# Patient Record
Sex: FEMALE | Race: WHITE | NOT HISPANIC OR LATINO
[De-identification: names, ages, dates, MRNs, and addresses within clinical notes are randomized per-mention and may not be internally consistent; named-entity substitution may affect disease eponyms.]

---

## 2018-09-11 ENCOUNTER — FORM ENCOUNTER (OUTPATIENT)
Age: 48
End: 2018-09-11

## 2018-09-12 ENCOUNTER — APPOINTMENT (OUTPATIENT)
Dept: RHEUMATOLOGY | Facility: CLINIC | Age: 48
End: 2018-09-12
Payer: MEDICAID

## 2018-09-12 ENCOUNTER — LABORATORY RESULT (OUTPATIENT)
Age: 48
End: 2018-09-12

## 2018-09-12 ENCOUNTER — APPOINTMENT (OUTPATIENT)
Dept: ENDOCRINOLOGY | Facility: CLINIC | Age: 48
End: 2018-09-12
Payer: MEDICAID

## 2018-09-12 VITALS
BODY MASS INDEX: 19.81 KG/M2 | HEART RATE: 98 BPM | HEIGHT: 64 IN | OXYGEN SATURATION: 99 % | DIASTOLIC BLOOD PRESSURE: 77 MMHG | SYSTOLIC BLOOD PRESSURE: 139 MMHG | TEMPERATURE: 99.1 F | WEIGHT: 116 LBS

## 2018-09-12 DIAGNOSIS — Z98.1 ARTHRODESIS STATUS: ICD-10-CM

## 2018-09-12 DIAGNOSIS — F17.200 NICOTINE DEPENDENCE, UNSPECIFIED, UNCOMPLICATED: ICD-10-CM

## 2018-09-12 DIAGNOSIS — Z87.39 PERSONAL HISTORY OF OTHER DISEASES OF THE MUSCULOSKELETAL SYSTEM AND CONNECTIVE TISSUE: ICD-10-CM

## 2018-09-12 PROBLEM — Z00.00 ENCOUNTER FOR PREVENTIVE HEALTH EXAMINATION: Status: ACTIVE | Noted: 2018-09-12

## 2018-09-12 PROCEDURE — 73610 X-RAY EXAM OF ANKLE: CPT | Mod: RT

## 2018-09-12 PROCEDURE — 77080 DXA BONE DENSITY AXIAL: CPT

## 2018-09-12 PROCEDURE — 99204 OFFICE O/P NEW MOD 45 MIN: CPT

## 2018-09-12 PROCEDURE — 73630 X-RAY EXAM OF FOOT: CPT | Mod: RT

## 2018-09-12 PROCEDURE — 73130 X-RAY EXAM OF HAND: CPT | Mod: LT

## 2018-09-14 ENCOUNTER — TRANSCRIPTION ENCOUNTER (OUTPATIENT)
Age: 48
End: 2018-09-14

## 2018-09-14 LAB
25(OH)D3 SERPL-MCNC: 41 NG/ML
ALBUMIN SERPL ELPH-MCNC: 4.4 G/DL
ALP BLD-CCNC: 150 U/L
ALT SERPL-CCNC: 13 U/L
ANION GAP SERPL CALC-SCNC: 16 MMOL/L
APPEARANCE: CLEAR
AST SERPL-CCNC: 14 U/L
B BURGDOR IGG+IGM SER QL IB: NORMAL
BASOPHILS # BLD AUTO: 0.12 K/UL
BASOPHILS NFR BLD AUTO: 0.7 %
BILIRUB SERPL-MCNC: 0.3 MG/DL
BILIRUBIN URINE: NEGATIVE
BLOOD URINE: ABNORMAL
BUN SERPL-MCNC: 15 MG/DL
CALCIUM SERPL-MCNC: 9.6 MG/DL
CCP AB SER IA-ACNC: >250 UNITS
CHLORIDE SERPL-SCNC: 102 MMOL/L
CO2 SERPL-SCNC: 24 MMOL/L
COLOR: YELLOW
CREAT SERPL-MCNC: 0.66 MG/DL
CRP SERPL-MCNC: 1.13 MG/DL
DEPRECATED KAPPA LC FREE/LAMBDA SER: 0.96 RATIO
EOSINOPHIL # BLD AUTO: 0.32 K/UL
EOSINOPHIL NFR BLD AUTO: 1.9 %
ERYTHROCYTE [SEDIMENTATION RATE] IN BLOOD BY WESTERGREN METHOD: 49 MM/HR
FERRITIN SERPL-MCNC: 92 NG/ML
FOLATE SERPL-MCNC: 19.2 NG/ML
GLUCOSE QUALITATIVE U: NEGATIVE MG/DL
GLUCOSE SERPL-MCNC: 79 MG/DL
HBV CORE IGG+IGM SER QL: NONREACTIVE
HBV SURFACE AB SER QL: NONREACTIVE
HBV SURFACE AG SER QL: NONREACTIVE
HCT VFR BLD CALC: 45.9 %
HCV AB SER QL: NONREACTIVE
HCV S/CO RATIO: 0.29 S/CO
HGB BLD-MCNC: 14.7 G/DL
IGA SER QL IEP: 203 MG/DL
IGG SER QL IEP: 1210 MG/DL
IGM SER QL IEP: 227 MG/DL
IMM GRANULOCYTES NFR BLD AUTO: 0.4 %
IRON SATN MFR SERPL: 9 %
IRON SERPL-MCNC: 27 UG/DL
KAPPA LC CSF-MCNC: 2.51 MG/DL
KAPPA LC SERPL-MCNC: 2.42 MG/DL
KETONES URINE: ABNORMAL
LEUKOCYTE ESTERASE URINE: ABNORMAL
LYMPHOCYTES # BLD AUTO: 4.7 K/UL
LYMPHOCYTES NFR BLD AUTO: 27.6 %
M TB IFN-G BLD-IMP: NEGATIVE
MAN DIFF?: NORMAL
MCHC RBC-ENTMCNC: 29.5 PG
MCHC RBC-ENTMCNC: 32 GM/DL
MCV RBC AUTO: 92.2 FL
MONOCYTES # BLD AUTO: 1.17 K/UL
MONOCYTES NFR BLD AUTO: 6.9 %
NEUTROPHILS # BLD AUTO: 10.63 K/UL
NEUTROPHILS NFR BLD AUTO: 62.5 %
NITRITE URINE: NEGATIVE
PH URINE: 6
PLATELET # BLD AUTO: 762 K/UL
POTASSIUM SERPL-SCNC: 4.6 MMOL/L
PROT SERPL-MCNC: 7.9 G/DL
PROTEIN URINE: NEGATIVE MG/DL
QUANTIFERON TB PLUS MITOGEN MINUS NIL: 7.79 IU/ML
QUANTIFERON TB PLUS NIL: 0.02 IU/ML
QUANTIFERON TB PLUS TB1 MINUS NIL: 0 IU/ML
QUANTIFERON TB PLUS TB2 MINUS NIL: 0.01 IU/ML
RBC # BLD: 4.98 M/UL
RBC # FLD: 14.4 %
RF+CCP IGG SER-IMP: ABNORMAL
RHEUMATOID FACT SER QL: 436 IU/ML
SODIUM SERPL-SCNC: 142 MMOL/L
SPECIFIC GRAVITY URINE: 1.01
TIBC SERPL-MCNC: 311 UG/DL
TSH SERPL-ACNC: 0.81 UIU/ML
UIBC SERPL-MCNC: 284 UG/DL
UROBILINOGEN URINE: NEGATIVE MG/DL
VIT B12 SERPL-MCNC: 413 PG/ML
WBC # FLD AUTO: 17.01 K/UL

## 2018-09-15 LAB
ARSENIC, BLOOD: 5 UG/L
CADMIUM SERPL-MCNC: NORMAL UG/L
LEAD BLD-MCNC: NORMAL UG/DL
MERCURY BLD-MCNC: NORMAL UG/L

## 2018-09-25 ENCOUNTER — RX RENEWAL (OUTPATIENT)
Age: 48
End: 2018-09-25

## 2018-09-25 ENCOUNTER — LABORATORY RESULT (OUTPATIENT)
Age: 48
End: 2018-09-25

## 2018-09-25 LAB
BASOPHILS # BLD AUTO: 0.13 K/UL
BASOPHILS NFR BLD AUTO: 0.4 %
CRP SERPL-MCNC: 0.14 MG/DL
EOSINOPHIL # BLD AUTO: 0.18 K/UL
EOSINOPHIL NFR BLD AUTO: 0.6 %
ERYTHROCYTE [SEDIMENTATION RATE] IN BLOOD BY WESTERGREN METHOD: 28 MM/HR
HCT VFR BLD CALC: 45.8 %
HGB BLD-MCNC: 15.2 G/DL
IMM GRANULOCYTES NFR BLD AUTO: 0.7 %
LYMPHOCYTES # BLD AUTO: 4.4 K/UL
LYMPHOCYTES NFR BLD AUTO: 15 %
MAN DIFF?: NORMAL
MCHC RBC-ENTMCNC: 30.8 PG
MCHC RBC-ENTMCNC: 33.2 GM/DL
MCV RBC AUTO: 92.7 FL
MONOCYTES # BLD AUTO: 1.13 K/UL
MONOCYTES NFR BLD AUTO: 3.9 %
NEUTROPHILS # BLD AUTO: 23.29 K/UL
NEUTROPHILS NFR BLD AUTO: 79.4 %
PLATELET # BLD AUTO: 564 K/UL
RBC # BLD: 4.94 M/UL
RBC # FLD: 15.3 %
WBC # FLD AUTO: 29.34 K/UL

## 2018-10-11 ENCOUNTER — LABORATORY RESULT (OUTPATIENT)
Age: 48
End: 2018-10-11

## 2018-10-11 ENCOUNTER — APPOINTMENT (OUTPATIENT)
Dept: RHEUMATOLOGY | Facility: CLINIC | Age: 48
End: 2018-10-11
Payer: MEDICAID

## 2018-10-11 VITALS
RESPIRATION RATE: 16 BRPM | OXYGEN SATURATION: 98 % | HEIGHT: 64 IN | HEART RATE: 96 BPM | SYSTOLIC BLOOD PRESSURE: 125 MMHG | TEMPERATURE: 98.8 F | BODY MASS INDEX: 20.32 KG/M2 | WEIGHT: 119 LBS | DIASTOLIC BLOOD PRESSURE: 77 MMHG

## 2018-10-11 DIAGNOSIS — H92.09 OTALGIA, UNSPECIFIED EAR: ICD-10-CM

## 2018-10-11 LAB
APPEARANCE: CLEAR
BILIRUBIN URINE: NEGATIVE
BLOOD URINE: NEGATIVE
COLOR: YELLOW
GLUCOSE QUALITATIVE U: NEGATIVE MG/DL
KETONES URINE: NEGATIVE
LEUKOCYTE ESTERASE URINE: NEGATIVE
NITRITE URINE: NEGATIVE
PH URINE: 7.5
PROTEIN URINE: NEGATIVE MG/DL
SPECIFIC GRAVITY URINE: 1.01
UROBILINOGEN URINE: NEGATIVE MG/DL

## 2018-10-11 PROCEDURE — 99214 OFFICE O/P EST MOD 30 MIN: CPT

## 2018-10-12 LAB
25(OH)D3 SERPL-MCNC: 27.1 NG/ML
ALBUMIN SERPL ELPH-MCNC: 4.6 G/DL
ALP BLD-CCNC: 121 U/L
ALT SERPL-CCNC: 18 U/L
ANION GAP SERPL CALC-SCNC: 15 MMOL/L
AST SERPL-CCNC: 17 U/L
BASOPHILS # BLD AUTO: 0.08 K/UL
BASOPHILS NFR BLD AUTO: 0.4 %
BILIRUB SERPL-MCNC: 0.4 MG/DL
BUN SERPL-MCNC: 13 MG/DL
CALCIUM SERPL-MCNC: 9.8 MG/DL
CHLORIDE SERPL-SCNC: 100 MMOL/L
CO2 SERPL-SCNC: 27 MMOL/L
CREAT SERPL-MCNC: 0.71 MG/DL
CRP SERPL-MCNC: 0.37 MG/DL
EOSINOPHIL # BLD AUTO: 0.05 K/UL
EOSINOPHIL NFR BLD AUTO: 0.2 %
ERYTHROCYTE [SEDIMENTATION RATE] IN BLOOD BY WESTERGREN METHOD: 35 MM/HR
GLUCOSE SERPL-MCNC: 98 MG/DL
HCT VFR BLD CALC: 47.5 %
HGB BLD-MCNC: 15.4 G/DL
IMM GRANULOCYTES NFR BLD AUTO: 0.9 %
IRON SATN MFR SERPL: 18 %
IRON SERPL-MCNC: 53 UG/DL
LYMPHOCYTES # BLD AUTO: 1.71 K/UL
LYMPHOCYTES NFR BLD AUTO: 7.9 %
MAN DIFF?: NORMAL
MCHC RBC-ENTMCNC: 30.7 PG
MCHC RBC-ENTMCNC: 32.4 GM/DL
MCV RBC AUTO: 94.6 FL
MONOCYTES # BLD AUTO: 0.55 K/UL
MONOCYTES NFR BLD AUTO: 2.5 %
NEUTROPHILS # BLD AUTO: 19.01 K/UL
NEUTROPHILS NFR BLD AUTO: 88.1 %
PLATELET # BLD AUTO: 677 K/UL
POTASSIUM SERPL-SCNC: 4.8 MMOL/L
PROT SERPL-MCNC: 7.6 G/DL
RBC # BLD: 5.02 M/UL
RBC # FLD: 15.7 %
SODIUM SERPL-SCNC: 142 MMOL/L
T3FREE SERPL-MCNC: 3.21 PG/ML
T4 FREE SERPL-MCNC: 1.4 NG/DL
TIBC SERPL-MCNC: 291 UG/DL
UIBC SERPL-MCNC: 238 UG/DL
WBC # FLD AUTO: 21.59 K/UL

## 2018-10-17 LAB — T(9;22)(ABL1,BCR)/CONTROL BLD/T: NORMAL

## 2018-10-30 ENCOUNTER — RX RENEWAL (OUTPATIENT)
Age: 48
End: 2018-10-30

## 2018-11-01 ENCOUNTER — CHART COPY (OUTPATIENT)
Age: 48
End: 2018-11-01

## 2018-11-02 ENCOUNTER — CHART COPY (OUTPATIENT)
Age: 48
End: 2018-11-02

## 2018-11-13 ENCOUNTER — APPOINTMENT (OUTPATIENT)
Dept: RHEUMATOLOGY | Facility: CLINIC | Age: 48
End: 2018-11-13

## 2018-11-14 ENCOUNTER — APPOINTMENT (OUTPATIENT)
Dept: RHEUMATOLOGY | Facility: CLINIC | Age: 48
End: 2018-11-14
Payer: MEDICAID

## 2018-11-14 ENCOUNTER — LABORATORY RESULT (OUTPATIENT)
Age: 48
End: 2018-11-14

## 2018-11-14 PROCEDURE — 99214 OFFICE O/P EST MOD 30 MIN: CPT

## 2018-11-15 ENCOUNTER — APPOINTMENT (OUTPATIENT)
Dept: RHEUMATOLOGY | Facility: CLINIC | Age: 48
End: 2018-11-15

## 2018-11-15 ENCOUNTER — RX RENEWAL (OUTPATIENT)
Age: 48
End: 2018-11-15

## 2018-11-15 LAB — ERYTHROCYTE [SEDIMENTATION RATE] IN BLOOD BY WESTERGREN METHOD: 20 MM/HR

## 2018-11-16 LAB
BASOPHILS # BLD AUTO: 0.11 K/UL
BASOPHILS NFR BLD AUTO: 0.5 %
EOSINOPHIL # BLD AUTO: 0.14 K/UL
EOSINOPHIL NFR BLD AUTO: 0.6 %
HCT VFR BLD CALC: 47 %
HGB BLD-MCNC: 14.9 G/DL
IMM GRANULOCYTES NFR BLD AUTO: 0.4 %
LYMPHOCYTES # BLD AUTO: 3.98 K/UL
LYMPHOCYTES NFR BLD AUTO: 16.6 %
MAN DIFF?: NORMAL
MCHC RBC-ENTMCNC: 29.7 PG
MCHC RBC-ENTMCNC: 31.7 GM/DL
MCV RBC AUTO: 93.8 FL
MONOCYTES # BLD AUTO: 1.26 K/UL
MONOCYTES NFR BLD AUTO: 5.3 %
NEUTROPHILS # BLD AUTO: 18.39 K/UL
NEUTROPHILS NFR BLD AUTO: 76.6 %
PLATELET # BLD AUTO: 607 K/UL
RBC # BLD: 5.01 M/UL
RBC # FLD: 16.6 %
WBC # FLD AUTO: 23.98 K/UL

## 2018-12-21 ENCOUNTER — APPOINTMENT (OUTPATIENT)
Dept: RHEUMATOLOGY | Facility: CLINIC | Age: 48
End: 2018-12-21

## 2019-01-01 ENCOUNTER — TRANSCRIPTION ENCOUNTER (OUTPATIENT)
Age: 49
End: 2019-01-01

## 2019-01-01 ENCOUNTER — LABORATORY RESULT (OUTPATIENT)
Age: 49
End: 2019-01-01

## 2019-01-01 ENCOUNTER — RX RENEWAL (OUTPATIENT)
Age: 49
End: 2019-01-01

## 2019-01-01 ENCOUNTER — APPOINTMENT (OUTPATIENT)
Dept: RHEUMATOLOGY | Facility: CLINIC | Age: 49
End: 2019-01-01

## 2019-01-01 ENCOUNTER — APPOINTMENT (OUTPATIENT)
Dept: RHEUMATOLOGY | Facility: CLINIC | Age: 49
End: 2019-01-01
Payer: MEDICAID

## 2019-01-01 VITALS
BODY MASS INDEX: 23.19 KG/M2 | TEMPERATURE: 98.8 F | DIASTOLIC BLOOD PRESSURE: 77 MMHG | HEART RATE: 102 BPM | SYSTOLIC BLOOD PRESSURE: 146 MMHG | OXYGEN SATURATION: 96 % | WEIGHT: 126 LBS | HEIGHT: 62 IN

## 2019-01-01 LAB
ALBUMIN MFR SERPL ELPH: 56.8 %
ALBUMIN SERPL ELPH-MCNC: 4.2 G/DL
ALBUMIN SERPL-MCNC: 3.7 G/DL
ALBUMIN/GLOB SERPL: 1.3 RATIO
ALP BLD-CCNC: 116 U/L
ALPHA1 GLOB MFR SERPL ELPH: 4.7 %
ALPHA1 GLOB SERPL ELPH-MCNC: 0.3 G/DL
ALPHA2 GLOB MFR SERPL ELPH: 14.6 %
ALPHA2 GLOB SERPL ELPH-MCNC: 0.9 G/DL
ALT SERPL-CCNC: 11 U/L
ANION GAP SERPL CALC-SCNC: 13 MMOL/L
APPEARANCE: CLEAR
AST SERPL-CCNC: 15 U/L
B-GLOBULIN MFR SERPL ELPH: 11.9 %
B-GLOBULIN SERPL ELPH-MCNC: 0.8 G/DL
BASOPHILS # BLD AUTO: 0 K/UL
BASOPHILS # BLD AUTO: 0.19 K/UL
BASOPHILS NFR BLD AUTO: 0 %
BASOPHILS NFR BLD AUTO: 0.8 %
BILIRUB SERPL-MCNC: 0.3 MG/DL
BILIRUBIN URINE: NEGATIVE
BLOOD URINE: NORMAL
BUN SERPL-MCNC: 20 MG/DL
CALCIUM SERPL-MCNC: 9.4 MG/DL
CHLORIDE SERPL-SCNC: 104 MMOL/L
CO2 SERPL-SCNC: 25 MMOL/L
COLOR: NORMAL
CREAT SERPL-MCNC: 0.7 MG/DL
CRP SERPL-MCNC: 0.62 MG/DL
DEPRECATED KAPPA LC FREE/LAMBDA SER: 0.83 RATIO
EOSINOPHIL # BLD AUTO: 0 K/UL
EOSINOPHIL # BLD AUTO: 0.09 K/UL
EOSINOPHIL NFR BLD AUTO: 0 %
EOSINOPHIL NFR BLD AUTO: 0.4 %
ERYTHROCYTE [SEDIMENTATION RATE] IN BLOOD BY WESTERGREN METHOD: 33 MM/HR
FOLATE SERPL-MCNC: >20 NG/ML
GAMMA GLOB FLD ELPH-MCNC: 0.8 G/DL
GAMMA GLOB MFR SERPL ELPH: 12 %
GLUCOSE QUALITATIVE U: NEGATIVE
GLUCOSE SERPL-MCNC: 82 MG/DL
HCT VFR BLD CALC: 46.4 %
HCT VFR BLD CALC: 49.2 %
HGB BLD-MCNC: 14.7 G/DL
HGB BLD-MCNC: 14.8 G/DL
IGA 24H UR QL IFE: NORMAL
IGA SER QL IEP: 173 MG/DL
IGG SER QL IEP: 750 MG/DL
IGM SER QL IEP: 174 MG/DL
IMM GRANULOCYTES NFR BLD AUTO: 0.8 %
INTERPRETATION SERPL IEP-IMP: NORMAL
IRON SATN MFR SERPL: 23 %
IRON SERPL-MCNC: 67 UG/DL
KAPPA LC CSF-MCNC: 2.25 MG/DL
KAPPA LC SERPL-MCNC: 1.86 MG/DL
KETONES URINE: NEGATIVE
LEUKOCYTE ESTERASE URINE: NEGATIVE
LYMPHOCYTES # BLD AUTO: 3.08 K/UL
LYMPHOCYTES # BLD AUTO: 3.49 K/UL
LYMPHOCYTES NFR BLD AUTO: 12.6 %
LYMPHOCYTES NFR BLD AUTO: 13.9 %
M PROTEIN SPEC IFE-MCNC: NORMAL
M TB IFN-G BLD-IMP: NEGATIVE
MAN DIFF?: NORMAL
MAN DIFF?: NORMAL
MCHC RBC-ENTMCNC: 29.8 PG
MCHC RBC-ENTMCNC: 29.9 GM/DL
MCHC RBC-ENTMCNC: 30.5 PG
MCHC RBC-ENTMCNC: 31.9 GM/DL
MCV RBC AUTO: 95.7 FL
MCV RBC AUTO: 99.6 FL
MONOCYTES # BLD AUTO: 1.24 K/UL
MONOCYTES # BLD AUTO: 1.96 K/UL
MONOCYTES NFR BLD AUTO: 5.1 %
MONOCYTES NFR BLD AUTO: 7.8 %
NEUTROPHILS # BLD AUTO: 19.19 K/UL
NEUTROPHILS # BLD AUTO: 19.58 K/UL
NEUTROPHILS NFR BLD AUTO: 76.5 %
NEUTROPHILS NFR BLD AUTO: 80.3 %
NITRITE URINE: NEGATIVE
PH URINE: 7
PLATELET # BLD AUTO: 688 K/UL
PLATELET # BLD AUTO: 724 K/UL
POTASSIUM SERPL-SCNC: 5.1 MMOL/L
PROT SERPL-MCNC: 6.5 G/DL
PROT SERPL-MCNC: 6.5 G/DL
PROT SERPL-MCNC: 6.6 G/DL
PROTEIN URINE: NEGATIVE
QUANTIFERON TB PLUS MITOGEN MINUS NIL: 3.54 IU/ML
QUANTIFERON TB PLUS NIL: 0.01 IU/ML
QUANTIFERON TB PLUS TB1 MINUS NIL: 0.01 IU/ML
QUANTIFERON TB PLUS TB2 MINUS NIL: 0 IU/ML
RBC # BLD: 4.85 M/UL
RBC # BLD: 4.94 M/UL
RBC # FLD: 14.9 %
RBC # FLD: 15.4 %
SODIUM SERPL-SCNC: 142 MMOL/L
SPECIFIC GRAVITY URINE: 1.01
TIBC SERPL-MCNC: 297 UG/DL
TSH SERPL-ACNC: 0.5 UIU/ML
UIBC SERPL-MCNC: 230 UG/DL
UROBILINOGEN URINE: NORMAL
VIT B12 SERPL-MCNC: 326 PG/ML
WBC # FLD AUTO: 24.37 K/UL
WBC # FLD AUTO: 25.09 K/UL

## 2019-01-01 PROCEDURE — 99214 OFFICE O/P EST MOD 30 MIN: CPT

## 2019-01-01 RX ORDER — PREDNISONE 20 MG/1
20 TABLET ORAL
Qty: 30 | Refills: 2 | Status: DISCONTINUED | COMMUNITY
Start: 2018-09-12 | End: 2019-01-01

## 2019-01-18 ENCOUNTER — LABORATORY RESULT (OUTPATIENT)
Age: 49
End: 2019-01-18

## 2019-01-18 ENCOUNTER — APPOINTMENT (OUTPATIENT)
Dept: RHEUMATOLOGY | Facility: CLINIC | Age: 49
End: 2019-01-18
Payer: MEDICAID

## 2019-01-18 VITALS
HEART RATE: 93 BPM | DIASTOLIC BLOOD PRESSURE: 74 MMHG | WEIGHT: 125 LBS | OXYGEN SATURATION: 97 % | HEIGHT: 62 IN | BODY MASS INDEX: 23 KG/M2 | TEMPERATURE: 98.5 F | SYSTOLIC BLOOD PRESSURE: 139 MMHG

## 2019-01-18 DIAGNOSIS — G89.29 OTHER CHRONIC PAIN: ICD-10-CM

## 2019-01-18 LAB
ALBUMIN SERPL ELPH-MCNC: 4.3 G/DL
ALP BLD-CCNC: 104 U/L
ALT SERPL-CCNC: 11 U/L
ANION GAP SERPL CALC-SCNC: 15 MMOL/L
APPEARANCE: CLEAR
AST SERPL-CCNC: 13 U/L
BASOPHILS # BLD AUTO: 0.08 K/UL
BASOPHILS NFR BLD AUTO: 0.3 %
BILIRUB SERPL-MCNC: 0.6 MG/DL
BILIRUBIN URINE: NEGATIVE
BLOOD URINE: ABNORMAL
BUN SERPL-MCNC: 19 MG/DL
CALCIUM SERPL-MCNC: 9.5 MG/DL
CHLORIDE SERPL-SCNC: 104 MMOL/L
CO2 SERPL-SCNC: 26 MMOL/L
COLOR: YELLOW
CREAT SERPL-MCNC: 0.75 MG/DL
CRP SERPL-MCNC: 1.1 MG/DL
EOSINOPHIL # BLD AUTO: 0.09 K/UL
EOSINOPHIL NFR BLD AUTO: 0.3 %
ERYTHROCYTE [SEDIMENTATION RATE] IN BLOOD BY WESTERGREN METHOD: 39 MM/HR
GLUCOSE QUALITATIVE U: NEGATIVE MG/DL
GLUCOSE SERPL-MCNC: 96 MG/DL
HCT VFR BLD CALC: 48.5 %
HGB BLD-MCNC: 15.6 G/DL
IMM GRANULOCYTES NFR BLD AUTO: 0.6 %
KETONES URINE: NEGATIVE
LEUKOCYTE ESTERASE URINE: NEGATIVE
LYMPHOCYTES # BLD AUTO: 2.39 K/UL
LYMPHOCYTES NFR BLD AUTO: 8.8 %
MAN DIFF?: NORMAL
MCHC RBC-ENTMCNC: 30.4 PG
MCHC RBC-ENTMCNC: 32.2 GM/DL
MCV RBC AUTO: 94.4 FL
MONOCYTES # BLD AUTO: 1.42 K/UL
MONOCYTES NFR BLD AUTO: 5.2 %
NEUTROPHILS # BLD AUTO: 22.96 K/UL
NEUTROPHILS NFR BLD AUTO: 84.8 %
NITRITE URINE: NEGATIVE
PH URINE: 5
PLATELET # BLD AUTO: 590 K/UL
POTASSIUM SERPL-SCNC: 4.7 MMOL/L
PROT SERPL-MCNC: 6.9 G/DL
PROTEIN URINE: NEGATIVE MG/DL
RBC # BLD: 5.14 M/UL
RBC # FLD: 14.9 %
SODIUM SERPL-SCNC: 145 MMOL/L
SPECIFIC GRAVITY URINE: 1.03
UROBILINOGEN URINE: NEGATIVE MG/DL
WBC # FLD AUTO: 27.1 K/UL

## 2019-01-18 PROCEDURE — 99214 OFFICE O/P EST MOD 30 MIN: CPT

## 2019-01-18 RX ORDER — TRAMADOL HYDROCHLORIDE 50 MG/1
50 TABLET, COATED ORAL
Qty: 60 | Refills: 0 | Status: ACTIVE | COMMUNITY
Start: 2019-01-18 | End: 1900-01-01

## 2019-01-21 LAB
25(OH)D3 SERPL-MCNC: 22 NG/ML
ALBUMIN MFR SERPL ELPH: 58.1 %
ALBUMIN SERPL-MCNC: 4 G/DL
ALBUMIN/GLOB SERPL: 1.4 RATIO
ALPHA1 GLOB MFR SERPL ELPH: 4.6 %
ALPHA1 GLOB SERPL ELPH-MCNC: 0.3 G/DL
ALPHA2 GLOB MFR SERPL ELPH: 13.6 %
ALPHA2 GLOB SERPL ELPH-MCNC: 0.9 G/DL
B-GLOBULIN MFR SERPL ELPH: 11.4 %
B-GLOBULIN SERPL ELPH-MCNC: 0.8 G/DL
DEPRECATED KAPPA LC FREE/LAMBDA SER: 1.01 RATIO
GAMMA GLOB FLD ELPH-MCNC: 0.8 G/DL
GAMMA GLOB MFR SERPL ELPH: 12.3 %
IGA 24H UR QL IFE: NORMAL
IGA SER QL IEP: 162 MG/DL
IGG SER QL IEP: 982 MG/DL
IGM SER QL IEP: 202 MG/DL
INTERPRETATION SERPL IEP-IMP: NORMAL
KAPPA LC CSF-MCNC: 1.85 MG/DL
KAPPA LC SERPL-MCNC: 1.86 MG/DL
M PROTEIN SPEC IFE-MCNC: NORMAL
PROT SERPL-MCNC: 6.9 G/DL
PROT SERPL-MCNC: 6.9 G/DL

## 2019-03-01 ENCOUNTER — APPOINTMENT (OUTPATIENT)
Dept: RHEUMATOLOGY | Facility: CLINIC | Age: 49
End: 2019-03-01

## 2019-06-25 ENCOUNTER — APPOINTMENT (OUTPATIENT)
Dept: RHEUMATOLOGY | Facility: CLINIC | Age: 49
End: 2019-06-25
Payer: COMMERCIAL

## 2019-06-25 ENCOUNTER — LABORATORY RESULT (OUTPATIENT)
Age: 49
End: 2019-06-25

## 2019-06-25 VITALS
OXYGEN SATURATION: 97 % | HEIGHT: 62 IN | DIASTOLIC BLOOD PRESSURE: 74 MMHG | WEIGHT: 124 LBS | HEART RATE: 92 BPM | TEMPERATURE: 98.3 F | SYSTOLIC BLOOD PRESSURE: 128 MMHG | BODY MASS INDEX: 22.82 KG/M2

## 2019-06-25 PROCEDURE — 99214 OFFICE O/P EST MOD 30 MIN: CPT

## 2019-06-25 NOTE — ASSESSMENT
[FreeTextEntry1] : 48 year-old female with RA - not currently on medication , previously on Enbrel and methotrexate/SSZ\par \par 1. RA - increased in WBC - on prednisone 20 mg  for extended period of time - will re-start xeljanx - discuss risk of recurrent melanoma - will f/u with dermatology for continue monitoring. will start taper of prednisone by 10 mg every 2 weeks with decrease to 5 and 2.5 mg  afterwards.\par 2. Melanoma in situ - Pet scan denied by insurance company due to staging as in situ - referral to dermatology for further assessment\par 3. Leukocytosis - referral to hematology- not done\par 4. Goiter - multiple nodules - refused FNA - repeat US  with no changes\par \par I reviewed previous labs results with patients.\par Laboratory tests ordered today\par Diagnosis and Prognosis discussed\par Continue with current medications\par education provided on  leukocytosis\par F/u  7 weeks

## 2019-06-25 NOTE — REVIEW OF SYSTEMS
[Feeling Poorly] : feeling poorly [Feeling Tired] : feeling tired [As Noted in HPI] : as noted in HPI [Joint Swelling] : joint swelling [Joint Stiffness] : joint stiffness [Negative] : Heme/Lymph

## 2019-06-25 NOTE — PHYSICAL EXAM
[General Appearance - Alert] : alert [Neck Appearance] : the appearance of the neck was normal [General Appearance - In No Acute Distress] : in no acute distress [Neck Cervical Mass (___cm)] : no neck mass was observed [Jugular Venous Distention Increased] : there was no jugular-venous distention [Thyroid Diffuse Enlargement] : the thyroid was not enlarged [Thyroid Nodule] : there were no palpable thyroid nodules [Auscultation Breath Sounds / Voice Sounds] : lungs were clear to auscultation bilaterally [Heart Rate And Rhythm] : heart rate was normal and rhythm regular [Heart Sounds] : normal S1 and S2 [Heart Sounds Gallop] : no gallops [Murmurs] : no murmurs [Heart Sounds Pericardial Friction Rub] : no pericardial rub [Full Pulse] : the pedal pulses are present [Edema] : there was no peripheral edema [Bowel Sounds] : normal bowel sounds [Abdomen Soft] : soft [Abdomen Tenderness] : non-tender [Abdomen Mass (___ Cm)] : no abdominal mass palpated [Cervical Lymph Nodes Enlarged Posterior Bilaterally] : posterior cervical [Cervical Lymph Nodes Enlarged Anterior Bilaterally] : anterior cervical [Supraclavicular Lymph Nodes Enlarged Bilaterally] : supraclavicular [Skin Color & Pigmentation] : normal skin color and pigmentation [Skin Turgor] : normal skin turgor [] : no rash [Oriented To Time, Place, And Person] : oriented to person, place, and time [Impaired Insight] : insight and judgment were intact [Affect] : the affect was normal [FreeTextEntry1] : tender joints: none

## 2019-06-25 NOTE — HISTORY OF PRESENT ILLNESS
[___ Month(s) Ago] : [unfilled] month(s) ago [Arthralgias] : arthralgias [Joint Swelling] : joint swelling [Joint Warmth] : joint warmth [FreeTextEntry1] : still on prednisone - 20 mg - has not seen hematology due to insurance issues\par Leukocytosis persists - has not made an appt to see hematology\par Thyroid goiter - US with multiple nodules - patient refused FNA -will repeat now\par hx of  melanoma - reports as melanoma in situ - PET scan was denied by insurance - has appt with derm\par bilateral ankle pain - has flat feet - needs to see podiatry\par

## 2019-06-26 LAB
25(OH)D3 SERPL-MCNC: 28.1 NG/ML
ALBUMIN SERPL ELPH-MCNC: 4.2 G/DL
ALP BLD-CCNC: 100 U/L
ALT SERPL-CCNC: 14 U/L
ANION GAP SERPL CALC-SCNC: 20 MMOL/L
APPEARANCE: CLEAR
AST SERPL-CCNC: 15 U/L
BASOPHILS # BLD AUTO: 0.12 K/UL
BASOPHILS NFR BLD AUTO: 0.4 %
BILIRUB SERPL-MCNC: 0.4 MG/DL
BILIRUBIN URINE: NEGATIVE
BLOOD URINE: ABNORMAL
BUN SERPL-MCNC: 18 MG/DL
CALCIUM SERPL-MCNC: 9.3 MG/DL
CHLORIDE SERPL-SCNC: 100 MMOL/L
CO2 SERPL-SCNC: 22 MMOL/L
COLOR: YELLOW
CREAT SERPL-MCNC: 0.78 MG/DL
CRP SERPL-MCNC: 1.4 MG/DL
DEPRECATED KAPPA LC FREE/LAMBDA SER: 0.8 RATIO
EOSINOPHIL # BLD AUTO: 0.02 K/UL
EOSINOPHIL NFR BLD AUTO: 0.1 %
ERYTHROCYTE [SEDIMENTATION RATE] IN BLOOD BY WESTERGREN METHOD: 45 MM/HR
FERRITIN SERPL-MCNC: 106 NG/ML
FOLATE SERPL-MCNC: 19.4 NG/ML
GLUCOSE QUALITATIVE U: NEGATIVE
GLUCOSE SERPL-MCNC: 53 MG/DL
HBV CORE IGG+IGM SER QL: NONREACTIVE
HBV SURFACE AB SER QL: NONREACTIVE
HBV SURFACE AG SER QL: NONREACTIVE
HCT VFR BLD CALC: 49.2 %
HCV AB SER QL: NONREACTIVE
HCV S/CO RATIO: 0.16 S/CO
HGB BLD-MCNC: 15.1 G/DL
IGA SER QL IEP: 149 MG/DL
IGG SER QL IEP: 769 MG/DL
IGM SER QL IEP: 162 MG/DL
IMM GRANULOCYTES NFR BLD AUTO: 1.1 %
IRON SATN MFR SERPL: 12 %
IRON SERPL-MCNC: 35 UG/DL
KAPPA LC CSF-MCNC: 2.44 MG/DL
KAPPA LC SERPL-MCNC: 1.95 MG/DL
KETONES URINE: NEGATIVE
LEUKOCYTE ESTERASE URINE: NEGATIVE
LYMPHOCYTES # BLD AUTO: 1.81 K/UL
LYMPHOCYTES NFR BLD AUTO: 6 %
MAN DIFF?: NORMAL
MCHC RBC-ENTMCNC: 29.4 PG
MCHC RBC-ENTMCNC: 30.7 GM/DL
MCV RBC AUTO: 95.7 FL
MONOCYTES # BLD AUTO: 0.59 K/UL
MONOCYTES NFR BLD AUTO: 1.9 %
NEUTROPHILS # BLD AUTO: 27.47 K/UL
NEUTROPHILS NFR BLD AUTO: 90.5 %
NITRITE URINE: NEGATIVE
PH URINE: 5.5
PLATELET # BLD AUTO: 663 K/UL
POTASSIUM SERPL-SCNC: 4.4 MMOL/L
PROT SERPL-MCNC: 6.8 G/DL
PROTEIN URINE: NORMAL
RBC # BLD: 5.14 M/UL
RBC # FLD: 14.9 %
SODIUM SERPL-SCNC: 142 MMOL/L
SPECIFIC GRAVITY URINE: 1.02
TIBC SERPL-MCNC: 293 UG/DL
TSH SERPL-ACNC: 0.57 UIU/ML
UIBC SERPL-MCNC: 258 UG/DL
UROBILINOGEN URINE: NORMAL
VIT B12 SERPL-MCNC: 293 PG/ML
WBC # FLD AUTO: 30.33 K/UL

## 2019-06-26 RX ORDER — PREDNISONE 2.5 MG/1
2.5 TABLET ORAL 3 TIMES DAILY
Qty: 90 | Refills: 2 | Status: DISCONTINUED | COMMUNITY
Start: 2018-11-14 | End: 2019-06-26

## 2019-06-28 LAB
M TB IFN-G BLD-IMP: ABNORMAL
QUANTIFERON TB PLUS MITOGEN MINUS NIL: 0.01 IU/ML
QUANTIFERON TB PLUS NIL: 0.03 IU/ML
QUANTIFERON TB PLUS TB1 MINUS NIL: -0.01 IU/ML
QUANTIFERON TB PLUS TB2 MINUS NIL: -0.01 IU/ML

## 2020-01-01 ENCOUNTER — RESULT REVIEW (OUTPATIENT)
Age: 50
End: 2020-01-01

## 2020-01-01 ENCOUNTER — INPATIENT (INPATIENT)
Facility: HOSPITAL | Age: 50
LOS: 1 days | End: 2020-07-29
Admitting: HOSPITALIST
Payer: MEDICAID

## 2020-01-01 ENCOUNTER — APPOINTMENT (OUTPATIENT)
Dept: RHEUMATOLOGY | Facility: CLINIC | Age: 50
End: 2020-01-01
Payer: MEDICAID

## 2020-01-01 ENCOUNTER — RX RENEWAL (OUTPATIENT)
Age: 50
End: 2020-01-01

## 2020-01-01 ENCOUNTER — APPOINTMENT (OUTPATIENT)
Dept: HEMATOLOGY ONCOLOGY | Facility: CLINIC | Age: 50
End: 2020-01-01
Payer: MEDICAID

## 2020-01-01 ENCOUNTER — OUTPATIENT (OUTPATIENT)
Dept: OUTPATIENT SERVICES | Facility: HOSPITAL | Age: 50
LOS: 1 days | Discharge: HOME | End: 2020-01-01

## 2020-01-01 ENCOUNTER — APPOINTMENT (OUTPATIENT)
Dept: RHEUMATOLOGY | Facility: CLINIC | Age: 50
End: 2020-01-01

## 2020-01-01 VITALS
WEIGHT: 125.66 LBS | RESPIRATION RATE: 17 BRPM | TEMPERATURE: 97.8 F | HEART RATE: 103 BPM | SYSTOLIC BLOOD PRESSURE: 112 MMHG | DIASTOLIC BLOOD PRESSURE: 72 MMHG | OXYGEN SATURATION: 94 % | BODY MASS INDEX: 23.12 KG/M2 | HEIGHT: 62 IN

## 2020-01-01 VITALS
HEART RATE: 94 BPM | OXYGEN SATURATION: 98 % | RESPIRATION RATE: 16 BRPM | SYSTOLIC BLOOD PRESSURE: 131 MMHG | WEIGHT: 130 LBS | HEIGHT: 62 IN | DIASTOLIC BLOOD PRESSURE: 70 MMHG | BODY MASS INDEX: 23.92 KG/M2 | TEMPERATURE: 98.3 F

## 2020-01-01 VITALS
SYSTOLIC BLOOD PRESSURE: 101 MMHG | RESPIRATION RATE: 18 BRPM | TEMPERATURE: 99 F | OXYGEN SATURATION: 95 % | DIASTOLIC BLOOD PRESSURE: 57 MMHG | HEART RATE: 122 BPM

## 2020-01-01 DIAGNOSIS — D72.829 ELEVATED WHITE BLOOD CELL COUNT, UNSPECIFIED: ICD-10-CM

## 2020-01-01 DIAGNOSIS — E87.5 HYPERKALEMIA: ICD-10-CM

## 2020-01-01 DIAGNOSIS — N17.9 ACUTE KIDNEY FAILURE, UNSPECIFIED: ICD-10-CM

## 2020-01-01 DIAGNOSIS — M06.9 RHEUMATOID ARTHRITIS, UNSPECIFIED: ICD-10-CM

## 2020-01-01 DIAGNOSIS — R59.1 GENERALIZED ENLARGED LYMPH NODES: ICD-10-CM

## 2020-01-01 DIAGNOSIS — R63.4 ABNORMAL WEIGHT LOSS: ICD-10-CM

## 2020-01-01 DIAGNOSIS — R79.89 OTHER SPECIFIED ABNORMAL FINDINGS OF BLOOD CHEMISTRY: ICD-10-CM

## 2020-01-01 DIAGNOSIS — R61 GENERALIZED HYPERHIDROSIS: ICD-10-CM

## 2020-01-01 DIAGNOSIS — R01.1 CARDIAC MURMUR, UNSPECIFIED: ICD-10-CM

## 2020-01-01 DIAGNOSIS — R74.0 NONSPECIFIC ELEVATION OF LEVELS OF TRANSAMINASE AND LACTIC ACID DEHYDROGENASE [LDH]: ICD-10-CM

## 2020-01-01 DIAGNOSIS — R06.02 SHORTNESS OF BREATH: ICD-10-CM

## 2020-01-01 DIAGNOSIS — R50.9 FEVER, UNSPECIFIED: ICD-10-CM

## 2020-01-01 DIAGNOSIS — E05.20 THYROTOXICOSIS WITH TOXIC MULTINODULAR GOITER W/OUT THYROTOXIC CRISIS OR STORM: ICD-10-CM

## 2020-01-01 DIAGNOSIS — Z85.820 PERSONAL HISTORY OF MALIGNANT MELANOMA OF SKIN: ICD-10-CM

## 2020-01-01 DIAGNOSIS — E87.2 ACIDOSIS: ICD-10-CM

## 2020-01-01 DIAGNOSIS — E04.9 NONTOXIC GOITER, UNSPECIFIED: ICD-10-CM

## 2020-01-01 DIAGNOSIS — E88.3 TUMOR LYSIS SYNDROME: ICD-10-CM

## 2020-01-01 DIAGNOSIS — D69.6 THROMBOCYTOPENIA, UNSPECIFIED: ICD-10-CM

## 2020-01-01 DIAGNOSIS — Z98.890 OTHER SPECIFIED POSTPROCEDURAL STATES: Chronic | ICD-10-CM

## 2020-01-01 DIAGNOSIS — R94.31 ABNORMAL ELECTROCARDIOGRAM [ECG] [EKG]: ICD-10-CM

## 2020-01-01 DIAGNOSIS — I74.10 EMBOLISM AND THROMBOSIS OF UNSPECIFIED PARTS OF AORTA: ICD-10-CM

## 2020-01-01 DIAGNOSIS — C85.90 NON-HODGKIN LYMPHOMA, UNSPECIFIED, UNSPECIFIED SITE: ICD-10-CM

## 2020-01-01 DIAGNOSIS — Z29.9 ENCOUNTER FOR PROPHYLACTIC MEASURES, UNSPECIFIED: ICD-10-CM

## 2020-01-01 LAB
ACE BLD-CCNC: 110 U/L
ALBUMIN SERPL ELPH-MCNC: 1.8 G/DL — LOW (ref 3.3–5)
ALBUMIN SERPL ELPH-MCNC: 2.2 G/DL — LOW (ref 3.3–5)
ALBUMIN SERPL ELPH-MCNC: 2.3 G/DL — LOW (ref 3.3–5)
ALBUMIN SERPL ELPH-MCNC: 2.6 G/DL — LOW (ref 3.3–5)
ALBUMIN SERPL ELPH-MCNC: 3.3 G/DL
ALBUMIN SERPL ELPH-MCNC: 4.5 G/DL
ALP BLD-CCNC: 115 U/L
ALP BLD-CCNC: 880 U/L
ALP SERPL-CCNC: 1191 U/L — HIGH (ref 40–120)
ALP SERPL-CCNC: 718 U/L — HIGH (ref 40–120)
ALP SERPL-CCNC: 891 U/L — HIGH (ref 40–120)
ALP SERPL-CCNC: 967 U/L — HIGH (ref 40–120)
ALT FLD-CCNC: 104 U/L — HIGH (ref 4–33)
ALT FLD-CCNC: 130 U/L — HIGH (ref 4–33)
ALT FLD-CCNC: 86 U/L — HIGH (ref 4–33)
ALT FLD-CCNC: 94 U/L — HIGH (ref 4–33)
ALT SERPL-CCNC: 17 U/L
ALT SERPL-CCNC: 195 U/L
ANION GAP SERPL CALC-SCNC: 15 MMOL/L
ANION GAP SERPL CALC-SCNC: 16 MMOL/L
ANION GAP SERPL CALC-SCNC: 19 MMO/L — HIGH (ref 7–14)
ANION GAP SERPL CALC-SCNC: 24 MMO/L — HIGH (ref 7–14)
ANION GAP SERPL CALC-SCNC: 30 MMO/L — HIGH (ref 7–14)
ANION GAP SERPL CALC-SCNC: 34 MMO/L — HIGH (ref 7–14)
ANISOCYTOSIS BLD QL: SLIGHT — SIGNIFICANT CHANGE UP
ANISOCYTOSIS BLD QL: SLIGHT — SIGNIFICANT CHANGE UP
APPEARANCE UR: SIGNIFICANT CHANGE UP
APTT BLD: 29.4 SEC — SIGNIFICANT CHANGE UP (ref 27–36.3)
APTT BLD: 30.9 SEC
APTT BLD: 31.3 SEC — SIGNIFICANT CHANGE UP (ref 27–36.3)
APTT BLD: 47.7 SEC — HIGH (ref 27–36.3)
APTT BLD: 69.4 SEC — HIGH (ref 27–36.3)
AST SERPL-CCNC: 108 U/L
AST SERPL-CCNC: 125 U/L — HIGH (ref 4–32)
AST SERPL-CCNC: 147 U/L — HIGH (ref 4–32)
AST SERPL-CCNC: 16 U/L
AST SERPL-CCNC: 179 U/L — HIGH (ref 4–32)
AST SERPL-CCNC: 232 U/L — HIGH (ref 4–32)
BACTERIA # UR AUTO: NEGATIVE — SIGNIFICANT CHANGE UP
BASE EXCESS BLDA CALC-SCNC: -18.8 MMOL/L — SIGNIFICANT CHANGE UP
BASE EXCESS BLDA CALC-SCNC: -22.2 MMOL/L — SIGNIFICANT CHANGE UP
BASE EXCESS BLDV CALC-SCNC: -8.2 MMOL/L — SIGNIFICANT CHANGE UP
BASOPHILS # BLD AUTO: 0 K/UL — SIGNIFICANT CHANGE UP (ref 0–0.2)
BASOPHILS # BLD AUTO: 0.07 K/UL — SIGNIFICANT CHANGE UP (ref 0–0.2)
BASOPHILS # BLD AUTO: 0.11 K/UL — SIGNIFICANT CHANGE UP (ref 0–0.2)
BASOPHILS # BLD AUTO: 0.12 K/UL — SIGNIFICANT CHANGE UP (ref 0–0.2)
BASOPHILS # BLD AUTO: 0.13 K/UL
BASOPHILS # BLD AUTO: 0.16 K/UL — SIGNIFICANT CHANGE UP (ref 0–0.2)
BASOPHILS NFR BLD AUTO: 0 % — SIGNIFICANT CHANGE UP (ref 0–2)
BASOPHILS NFR BLD AUTO: 0.1 % — SIGNIFICANT CHANGE UP (ref 0–2)
BASOPHILS NFR BLD AUTO: 0.2 % — SIGNIFICANT CHANGE UP (ref 0–2)
BASOPHILS NFR BLD AUTO: 0.6 %
BASOPHILS NFR SPEC: 0 % — SIGNIFICANT CHANGE UP (ref 0–2)
BILIRUB DIRECT SERPL-MCNC: 6.3 MG/DL — HIGH (ref 0.1–0.2)
BILIRUB SERPL-MCNC: 0.4 MG/DL
BILIRUB SERPL-MCNC: 5.2 MG/DL — HIGH (ref 0.2–1.2)
BILIRUB SERPL-MCNC: 5.6 MG/DL
BILIRUB SERPL-MCNC: 6.7 MG/DL — HIGH (ref 0.2–1.2)
BILIRUB SERPL-MCNC: 7 MG/DL — HIGH (ref 0.2–1.2)
BILIRUB SERPL-MCNC: 9.4 MG/DL — HIGH (ref 0.2–1.2)
BILIRUB UR-MCNC: HIGH
BLASTS # FLD: 0 % — SIGNIFICANT CHANGE UP (ref 0–0)
BLOOD UR QL VISUAL: NEGATIVE — SIGNIFICANT CHANGE UP
BUN SERPL-MCNC: 107 MG/DL — HIGH (ref 7–23)
BUN SERPL-MCNC: 25 MG/DL
BUN SERPL-MCNC: 52 MG/DL
BUN SERPL-MCNC: 82 MG/DL — HIGH (ref 7–23)
BUN SERPL-MCNC: 93 MG/DL — HIGH (ref 7–23)
BUN SERPL-MCNC: 97 MG/DL — HIGH (ref 7–23)
CA-I BLD-SCNC: 1.08 MMOL/L — SIGNIFICANT CHANGE UP (ref 1.03–1.23)
CA-I BLD-SCNC: 1.18 MMOL/L — SIGNIFICANT CHANGE UP (ref 1.03–1.23)
CALCIUM SERPL-MCNC: 7.3 MG/DL — LOW (ref 8.4–10.5)
CALCIUM SERPL-MCNC: 8.2 MG/DL — LOW (ref 8.4–10.5)
CALCIUM SERPL-MCNC: 8.8 MG/DL
CALCIUM SERPL-MCNC: 8.8 MG/DL — SIGNIFICANT CHANGE UP (ref 8.4–10.5)
CALCIUM SERPL-MCNC: 8.9 MG/DL — SIGNIFICANT CHANGE UP (ref 8.4–10.5)
CALCIUM SERPL-MCNC: 9.6 MG/DL
CHLORIDE BLDA-SCNC: 113 MMOL/L — HIGH (ref 96–108)
CHLORIDE BLDA-SCNC: 113 MMOL/L — HIGH (ref 96–108)
CHLORIDE SERPL-SCNC: 102 MMOL/L
CHLORIDE SERPL-SCNC: 104 MMOL/L — SIGNIFICANT CHANGE UP (ref 98–107)
CHLORIDE SERPL-SCNC: 93 MMOL/L
CHLORIDE SERPL-SCNC: 94 MMOL/L — LOW (ref 98–107)
CHLORIDE SERPL-SCNC: 98 MMOL/L — SIGNIFICANT CHANGE UP (ref 98–107)
CHLORIDE SERPL-SCNC: 98 MMOL/L — SIGNIFICANT CHANGE UP (ref 98–107)
CK MB BLD-MCNC: 4.61 NG/ML — SIGNIFICANT CHANGE UP (ref 1–4.7)
CK MB BLD-MCNC: 5.89 NG/ML — HIGH (ref 1–4.7)
CK SERPL-CCNC: 28 U/L — SIGNIFICANT CHANGE UP (ref 25–170)
CK SERPL-CCNC: 32 U/L — SIGNIFICANT CHANGE UP (ref 25–170)
CK SERPL-CCNC: 49 U/L — SIGNIFICANT CHANGE UP (ref 25–170)
CO2 SERPL-SCNC: 13 MMOL/L — LOW (ref 22–31)
CO2 SERPL-SCNC: 16 MMOL/L — LOW (ref 22–31)
CO2 SERPL-SCNC: 22 MMOL/L
CO2 SERPL-SCNC: 26 MMOL/L
CO2 SERPL-SCNC: 8 MMOL/L — CRITICAL LOW (ref 22–31)
CO2 SERPL-SCNC: 9 MMOL/L — CRITICAL LOW (ref 22–31)
COLOR SPEC: SIGNIFICANT CHANGE UP
CORTIS SERPL-MCNC: 43.1 UG/DL — HIGH (ref 2.7–18.4)
CREAT SERPL-MCNC: 0.77 MG/DL
CREAT SERPL-MCNC: 0.88 MG/DL — SIGNIFICANT CHANGE UP (ref 0.5–1.3)
CREAT SERPL-MCNC: 0.98 MG/DL
CREAT SERPL-MCNC: 0.99 MG/DL — SIGNIFICANT CHANGE UP (ref 0.5–1.3)
CREAT SERPL-MCNC: 1.16 MG/DL — SIGNIFICANT CHANGE UP (ref 0.5–1.3)
CREAT SERPL-MCNC: 1.45 MG/DL — HIGH (ref 0.5–1.3)
CRP SERPL-MCNC: 0.33 MG/DL
D DIMER BLD IA.RAPID-MCNC: 453 NG/ML — SIGNIFICANT CHANGE UP
D DIMER BLD IA.RAPID-MCNC: 453 NG/ML — SIGNIFICANT CHANGE UP
D DIMER BLD IA.RAPID-MCNC: 530 NG/ML — SIGNIFICANT CHANGE UP
D DIMER BLD IA.RAPID-MCNC: 642 NG/ML — SIGNIFICANT CHANGE UP
EOSINOPHIL # BLD AUTO: 0 K/UL — SIGNIFICANT CHANGE UP (ref 0–0.5)
EOSINOPHIL # BLD AUTO: 0 K/UL — SIGNIFICANT CHANGE UP (ref 0–0.5)
EOSINOPHIL # BLD AUTO: 0.03 K/UL
EOSINOPHIL # BLD AUTO: 0.03 K/UL — SIGNIFICANT CHANGE UP (ref 0–0.5)
EOSINOPHIL # BLD AUTO: 0.16 K/UL — SIGNIFICANT CHANGE UP (ref 0–0.5)
EOSINOPHIL # BLD AUTO: 0.26 K/UL — SIGNIFICANT CHANGE UP (ref 0–0.5)
EOSINOPHIL NFR BLD AUTO: 0 % — SIGNIFICANT CHANGE UP (ref 0–6)
EOSINOPHIL NFR BLD AUTO: 0 % — SIGNIFICANT CHANGE UP (ref 0–6)
EOSINOPHIL NFR BLD AUTO: 0.1 %
EOSINOPHIL NFR BLD AUTO: 0.1 % — SIGNIFICANT CHANGE UP (ref 0–6)
EOSINOPHIL NFR BLD AUTO: 0.3 % — SIGNIFICANT CHANGE UP (ref 0–6)
EOSINOPHIL NFR BLD AUTO: 0.4 % — SIGNIFICANT CHANGE UP (ref 0–6)
EOSINOPHIL NFR FLD: 0 % — SIGNIFICANT CHANGE UP (ref 0–6)
ERYTHROCYTE [SEDIMENTATION RATE] IN BLOOD BY WESTERGREN METHOD: 31 MM/HR
FIBRINOGEN PPP-MCNC: 129 MG/DL — LOW (ref 300–520)
FIBRINOGEN PPP-MCNC: 134 MG/DL — LOW (ref 300–520)
FIBRINOGEN PPP-MCNC: 146 MG/DL — LOW (ref 300–520)
FIBRINOGEN PPP-MCNC: 154 MG/DL — LOW (ref 300–520)
FIBRINOGEN PPP-MCNC: 195 MG/DL — LOW (ref 300–520)
GAS PNL BLDV: 128 MMOL/L — LOW (ref 136–146)
GIANT PLATELETS BLD QL SMEAR: PRESENT — SIGNIFICANT CHANGE UP
GLUCOSE BLDA-MCNC: 3 MG/DL — CRITICAL LOW (ref 70–99)
GLUCOSE BLDA-MCNC: 89 MG/DL — SIGNIFICANT CHANGE UP (ref 70–99)
GLUCOSE BLDC GLUCOMTR-MCNC: 118 MG/DL — HIGH (ref 70–99)
GLUCOSE BLDC GLUCOMTR-MCNC: 288 MG/DL — HIGH (ref 70–99)
GLUCOSE BLDC GLUCOMTR-MCNC: <25 MG/DL — CRITICAL LOW (ref 70–99)
GLUCOSE BLDV-MCNC: 108 MG/DL — HIGH (ref 70–99)
GLUCOSE SERPL-MCNC: 106 MG/DL — HIGH (ref 70–99)
GLUCOSE SERPL-MCNC: 114 MG/DL
GLUCOSE SERPL-MCNC: 133 MG/DL
GLUCOSE SERPL-MCNC: 41 MG/DL — CRITICAL LOW (ref 70–99)
GLUCOSE SERPL-MCNC: 65 MG/DL — LOW (ref 70–99)
GLUCOSE SERPL-MCNC: < 5 MG/DL — CRITICAL LOW (ref 70–99)
GLUCOSE UR-MCNC: NEGATIVE — SIGNIFICANT CHANGE UP
HAPTOGLOB SERPL-MCNC: 170 MG/DL — SIGNIFICANT CHANGE UP (ref 34–200)
HBV CORE IGG+IGM SER QL: NONREACTIVE
HBV SURFACE AB SER QL: NONREACTIVE
HBV SURFACE AG SER QL: NONREACTIVE
HCG UR-SCNC: NEGATIVE — SIGNIFICANT CHANGE UP
HCO3 BLDA-SCNC: 10 MMOL/L — CRITICAL LOW (ref 22–26)
HCO3 BLDA-SCNC: 8 MMOL/L — CRITICAL LOW (ref 22–26)
HCO3 BLDV-SCNC: 18 MMOL/L — LOW (ref 20–27)
HCT VFR BLD CALC: 27 % — LOW (ref 34.5–45)
HCT VFR BLD CALC: 33.7 % — LOW (ref 34.5–45)
HCT VFR BLD CALC: 33.9 % — LOW (ref 34.5–45)
HCT VFR BLD CALC: 41.1 % — SIGNIFICANT CHANGE UP (ref 34.5–45)
HCT VFR BLD CALC: 45 % — SIGNIFICANT CHANGE UP (ref 34.5–45)
HCT VFR BLD CALC: 51.3 %
HCT VFR BLDA CALC: 23.5 % — LOW (ref 34.5–46.5)
HCT VFR BLDA CALC: 26.9 % — LOW (ref 34.5–46.5)
HCT VFR BLDV CALC: 42.2 % — SIGNIFICANT CHANGE UP (ref 34.5–45)
HCV AB SER QL: NONREACTIVE
HCV S/CO RATIO: 0.13 S/CO
HGB BLD-MCNC: 10.6 G/DL — LOW (ref 11.5–15.5)
HGB BLD-MCNC: 11.4 G/DL — LOW (ref 11.5–15.5)
HGB BLD-MCNC: 13.4 G/DL — SIGNIFICANT CHANGE UP (ref 11.5–15.5)
HGB BLD-MCNC: 15.1 G/DL — SIGNIFICANT CHANGE UP (ref 11.5–15.5)
HGB BLD-MCNC: 15.8 G/DL
HGB BLD-MCNC: 8.4 G/DL — LOW (ref 11.5–15.5)
HGB BLDA-MCNC: 7.5 G/DL — LOW (ref 11.5–15.5)
HGB BLDA-MCNC: 8.7 G/DL — LOW (ref 11.5–15.5)
HGB BLDV-MCNC: 13.7 G/DL — SIGNIFICANT CHANGE UP (ref 11.5–15.5)
HYALINE CASTS # UR AUTO: HIGH
IMM GRANULOCYTES NFR BLD AUTO: 0.9 %
IMM GRANULOCYTES NFR BLD AUTO: 11.6 % — HIGH (ref 0–1.5)
IMM GRANULOCYTES NFR BLD AUTO: 20.7 % — HIGH (ref 0–1.5)
IMM GRANULOCYTES NFR BLD AUTO: 20.9 % — HIGH (ref 0–1.5)
IMM GRANULOCYTES NFR BLD AUTO: 32.6 % — HIGH (ref 0–1.5)
INR BLD: 1.35 — HIGH (ref 0.88–1.17)
INR BLD: 1.37 — HIGH (ref 0.88–1.17)
INR BLD: 1.64 — HIGH (ref 0.88–1.17)
INR BLD: 1.67 — HIGH (ref 0.88–1.17)
INR BLD: 2.12 — HIGH (ref 0.88–1.17)
INR PPP: 1.18 RATIO
KETONES UR-MCNC: NEGATIVE — SIGNIFICANT CHANGE UP
LACTATE BLDA-SCNC: 17 MMOL/L — CRITICAL HIGH (ref 0.5–2)
LACTATE BLDA-SCNC: 24 MMOL/L — CRITICAL HIGH (ref 0.5–2)
LDH SERPL L TO P-CCNC: 1475 U/L — HIGH (ref 135–225)
LDH SERPL L TO P-CCNC: 2006 U/L — HIGH (ref 135–225)
LDH SERPL L TO P-CCNC: 806 U/L — HIGH (ref 135–225)
LDH SERPL L TO P-CCNC: 855 U/L — HIGH (ref 135–225)
LDH SERPL L TO P-CCNC: 993 U/L — HIGH (ref 135–225)
LDH SERPL-CCNC: 718 U/L
LEUKOCYTE ESTERASE UR-ACNC: NEGATIVE — SIGNIFICANT CHANGE UP
LYMPHOCYTES # BLD AUTO: 10 % — LOW (ref 13–44)
LYMPHOCYTES # BLD AUTO: 11.37 K/UL — HIGH (ref 1–3.3)
LYMPHOCYTES # BLD AUTO: 15.5 % — SIGNIFICANT CHANGE UP (ref 13–44)
LYMPHOCYTES # BLD AUTO: 17.1 % — SIGNIFICANT CHANGE UP (ref 13–44)
LYMPHOCYTES # BLD AUTO: 2.2 K/UL
LYMPHOCYTES # BLD AUTO: 2.23 K/UL — SIGNIFICANT CHANGE UP (ref 1–3.3)
LYMPHOCYTES # BLD AUTO: 3.8 K/UL — HIGH (ref 1–3.3)
LYMPHOCYTES # BLD AUTO: 4.62 K/UL — HIGH (ref 1–3.3)
LYMPHOCYTES # BLD AUTO: 6.6 % — LOW (ref 13–44)
LYMPHOCYTES # BLD AUTO: 7.4 % — LOW (ref 13–44)
LYMPHOCYTES # BLD AUTO: 9.1 K/UL — HIGH (ref 1–3.3)
LYMPHOCYTES NFR BLD AUTO: 10.4 %
LYMPHOCYTES NFR SPEC AUTO: 2.6 % — LOW (ref 13–44)
MACROCYTES BLD QL: SLIGHT — SIGNIFICANT CHANGE UP
MACROCYTES BLD QL: SLIGHT — SIGNIFICANT CHANGE UP
MAGNESIUM SERPL-MCNC: 2.7 MG/DL — HIGH (ref 1.6–2.6)
MAGNESIUM SERPL-MCNC: 3.2 MG/DL — HIGH (ref 1.6–2.6)
MAGNESIUM SERPL-MCNC: 3.3 MG/DL — HIGH (ref 1.6–2.6)
MAN DIFF?: NORMAL
MANUAL SMEAR VERIFICATION: SIGNIFICANT CHANGE UP
MANUAL SMEAR VERIFICATION: SIGNIFICANT CHANGE UP
MCHC RBC-ENTMCNC: 28.8 PG — SIGNIFICANT CHANGE UP (ref 27–34)
MCHC RBC-ENTMCNC: 28.9 PG — SIGNIFICANT CHANGE UP (ref 27–34)
MCHC RBC-ENTMCNC: 29.3 PG — SIGNIFICANT CHANGE UP (ref 27–34)
MCHC RBC-ENTMCNC: 29.5 PG — SIGNIFICANT CHANGE UP (ref 27–34)
MCHC RBC-ENTMCNC: 29.6 PG — SIGNIFICANT CHANGE UP (ref 27–34)
MCHC RBC-ENTMCNC: 30.1 PG
MCHC RBC-ENTMCNC: 30.8 GM/DL
MCHC RBC-ENTMCNC: 31.1 % — LOW (ref 32–36)
MCHC RBC-ENTMCNC: 31.5 % — LOW (ref 32–36)
MCHC RBC-ENTMCNC: 32.6 % — SIGNIFICANT CHANGE UP (ref 32–36)
MCHC RBC-ENTMCNC: 33.6 % — SIGNIFICANT CHANGE UP (ref 32–36)
MCHC RBC-ENTMCNC: 33.6 GM/DL — SIGNIFICANT CHANGE UP (ref 32–36)
MCV RBC AUTO: 87.6 FL — SIGNIFICANT CHANGE UP (ref 80–100)
MCV RBC AUTO: 88.2 FL — SIGNIFICANT CHANGE UP (ref 80–100)
MCV RBC AUTO: 88.4 FL — SIGNIFICANT CHANGE UP (ref 80–100)
MCV RBC AUTO: 91.8 FL — SIGNIFICANT CHANGE UP (ref 80–100)
MCV RBC AUTO: 94.1 FL — SIGNIFICANT CHANGE UP (ref 80–100)
MCV RBC AUTO: 97.7 FL
METAMYELOCYTES # FLD: 2 % — HIGH (ref 0–0)
METAMYELOCYTES # FLD: 4.2 % — HIGH (ref 0–1)
MICROCYTES BLD QL: SLIGHT — SIGNIFICANT CHANGE UP
MONOCYTES # BLD AUTO: 0.79 K/UL
MONOCYTES # BLD AUTO: 11.12 K/UL — HIGH (ref 0–0.9)
MONOCYTES # BLD AUTO: 16.07 K/UL — HIGH (ref 0–0.9)
MONOCYTES # BLD AUTO: 2 K/UL — HIGH (ref 0–0.9)
MONOCYTES # BLD AUTO: 5.12 K/UL — HIGH (ref 0–0.9)
MONOCYTES # BLD AUTO: 6.96 K/UL — HIGH (ref 0–0.9)
MONOCYTES NFR BLD AUTO: 11.8 % — SIGNIFICANT CHANGE UP (ref 2–14)
MONOCYTES NFR BLD AUTO: 19.3 % — HIGH (ref 2–14)
MONOCYTES NFR BLD AUTO: 25.6 % — HIGH (ref 2–14)
MONOCYTES NFR BLD AUTO: 3.7 %
MONOCYTES NFR BLD AUTO: 7.7 % — SIGNIFICANT CHANGE UP (ref 2–14)
MONOCYTES NFR BLD AUTO: 9 % — SIGNIFICANT CHANGE UP (ref 2–14)
MONOCYTES NFR BLD: 11.9 % — HIGH (ref 2–9)
MYELOCYTES NFR BLD: 0 % — SIGNIFICANT CHANGE UP (ref 0–0)
NEUTROPHIL AB SER-ACNC: 71.2 % — SIGNIFICANT CHANGE UP (ref 43–77)
NEUTROPHILS # BLD AUTO: 17.59 K/UL — HIGH (ref 1.8–7.4)
NEUTROPHILS # BLD AUTO: 17.84 K/UL
NEUTROPHILS # BLD AUTO: 27.91 K/UL — HIGH (ref 1.8–7.4)
NEUTROPHILS # BLD AUTO: 28.58 K/UL — HIGH (ref 1.8–7.4)
NEUTROPHILS # BLD AUTO: 30.46 K/UL — HIGH (ref 1.8–7.4)
NEUTROPHILS # BLD AUTO: 36.03 K/UL — HIGH (ref 1.8–7.4)
NEUTROPHILS NFR BLD AUTO: 42 % — LOW (ref 43–77)
NEUTROPHILS NFR BLD AUTO: 45.6 % — SIGNIFICANT CHANGE UP (ref 43–77)
NEUTROPHILS NFR BLD AUTO: 51.8 % — SIGNIFICANT CHANGE UP (ref 43–77)
NEUTROPHILS NFR BLD AUTO: 62.3 % — SIGNIFICANT CHANGE UP (ref 43–77)
NEUTROPHILS NFR BLD AUTO: 79 % — HIGH (ref 43–77)
NEUTROPHILS NFR BLD AUTO: 84.3 %
NEUTS BAND # BLD: 2.5 % — SIGNIFICANT CHANGE UP (ref 0–6)
NITRITE UR-MCNC: NEGATIVE — SIGNIFICANT CHANGE UP
NRBC # BLD: 0 /100 — SIGNIFICANT CHANGE UP (ref 0–0)
NRBC # BLD: 1 /100WBC — SIGNIFICANT CHANGE UP
NRBC # BLD: SIGNIFICANT CHANGE UP /100 WBCS (ref 0–0)
NRBC # FLD: 0.43 K/UL — SIGNIFICANT CHANGE UP (ref 0–0)
NRBC # FLD: 2.43 K/UL — SIGNIFICANT CHANGE UP (ref 0–0)
NRBC # FLD: 3.76 K/UL — SIGNIFICANT CHANGE UP (ref 0–0)
NRBC # FLD: 5.81 K/UL — SIGNIFICANT CHANGE UP (ref 0–0)
NRBC FLD-RTO: 4.1 — SIGNIFICANT CHANGE UP
NRBC FLD-RTO: 6 — SIGNIFICANT CHANGE UP
NRBC FLD-RTO: 8.7 — SIGNIFICANT CHANGE UP
OTHER - HEMATOLOGY %: 5.1 — SIGNIFICANT CHANGE UP
PCO2 BLDA: 33 MMHG — SIGNIFICANT CHANGE UP (ref 32–48)
PCO2 BLDA: 40 MMHG — SIGNIFICANT CHANGE UP (ref 32–48)
PCO2 BLDV: 29 MMHG — LOW (ref 41–51)
PH BLDA: 6.92 PH — CRITICAL LOW (ref 7.35–7.45)
PH BLDA: 7.08 PH — CRITICAL LOW (ref 7.35–7.45)
PH BLDV: 7.36 PH — SIGNIFICANT CHANGE UP (ref 7.32–7.43)
PH UR: 5 — SIGNIFICANT CHANGE UP (ref 5–8)
PHOSPHATE SERPL-MCNC: 3.1 MG/DL — SIGNIFICANT CHANGE UP (ref 2.5–4.5)
PHOSPHATE SERPL-MCNC: 4.4 MG/DL — SIGNIFICANT CHANGE UP (ref 2.5–4.5)
PHOSPHATE SERPL-MCNC: 5.3 MG/DL — HIGH (ref 2.5–4.5)
PHOSPHATE SERPL-MCNC: 7.7 MG/DL — HIGH (ref 2.5–4.5)
PLAT MORPH BLD: NORMAL — SIGNIFICANT CHANGE UP
PLATELET # BLD AUTO: 108 K/UL — LOW (ref 150–400)
PLATELET # BLD AUTO: 111 K/UL — LOW (ref 150–400)
PLATELET # BLD AUTO: 115 K/UL — LOW (ref 150–400)
PLATELET # BLD AUTO: 123 K/UL — LOW (ref 150–400)
PLATELET # BLD AUTO: 663 K/UL
PLATELET # BLD AUTO: 98 K/UL — LOW (ref 150–400)
PLATELET COUNT - ESTIMATE: SIGNIFICANT CHANGE UP
PMV BLD: 12 FL — SIGNIFICANT CHANGE UP (ref 7–13)
PMV BLD: 12.9 FL — SIGNIFICANT CHANGE UP (ref 7–13)
PMV BLD: 13.1 FL — HIGH (ref 7–13)
PMV BLD: 13.1 FL — HIGH (ref 7–13)
PO2 BLDA: 122 MMHG — HIGH (ref 83–108)
PO2 BLDA: 81 MMHG — LOW (ref 83–108)
PO2 BLDV: 57 MMHG — HIGH (ref 35–40)
POLYCHROMASIA BLD QL SMEAR: SLIGHT — SIGNIFICANT CHANGE UP
POTASSIUM BLDA-SCNC: 6.5 MMOL/L — CRITICAL HIGH (ref 3.4–4.5)
POTASSIUM BLDA-SCNC: 7.5 MMOL/L — CRITICAL HIGH (ref 3.4–4.5)
POTASSIUM BLDV-SCNC: 5.4 MMOL/L — HIGH (ref 3.4–4.5)
POTASSIUM SERPL-MCNC: 5.6 MMOL/L — HIGH (ref 3.5–5.3)
POTASSIUM SERPL-MCNC: 6.6 MMOL/L — CRITICAL HIGH (ref 3.5–5.3)
POTASSIUM SERPL-MCNC: 6.9 MMOL/L — CRITICAL HIGH (ref 3.5–5.3)
POTASSIUM SERPL-MCNC: 7.7 MMOL/L — CRITICAL HIGH (ref 3.5–5.3)
POTASSIUM SERPL-SCNC: 4.9 MMOL/L
POTASSIUM SERPL-SCNC: 5.4 MMOL/L
POTASSIUM SERPL-SCNC: 5.6 MMOL/L — HIGH (ref 3.5–5.3)
POTASSIUM SERPL-SCNC: 6.6 MMOL/L — CRITICAL HIGH (ref 3.5–5.3)
POTASSIUM SERPL-SCNC: 6.9 MMOL/L — CRITICAL HIGH (ref 3.5–5.3)
POTASSIUM SERPL-SCNC: 7.7 MMOL/L — CRITICAL HIGH (ref 3.5–5.3)
PROMYELOCYTES # FLD: 0 % — SIGNIFICANT CHANGE UP (ref 0–0)
PROT SERPL-MCNC: 3.3 G/DL — LOW (ref 6–8.3)
PROT SERPL-MCNC: 4.1 G/DL — LOW (ref 6–8.3)
PROT SERPL-MCNC: 4.3 G/DL — LOW (ref 6–8.3)
PROT SERPL-MCNC: 4.9 G/DL — LOW (ref 6–8.3)
PROT SERPL-MCNC: 5.3 G/DL
PROT SERPL-MCNC: 6.9 G/DL
PROT UR-MCNC: 30 — SIGNIFICANT CHANGE UP
PROTHROM AB SERPL-ACNC: 15.7 SEC — HIGH (ref 9.8–13.1)
PROTHROM AB SERPL-ACNC: 15.8 SEC — HIGH (ref 9.8–13.1)
PROTHROM AB SERPL-ACNC: 19 SEC — HIGH (ref 9.8–13.1)
PROTHROM AB SERPL-ACNC: 19.5 SEC — HIGH (ref 9.8–13.1)
PROTHROM AB SERPL-ACNC: 24.7 SEC — HIGH (ref 9.8–13.1)
PT BLD: 13.8 SEC
RBC # BLD: 2.87 M/UL — LOW (ref 3.8–5.2)
RBC # BLD: 3.67 M/UL — LOW (ref 3.8–5.2)
RBC # BLD: 3.87 M/UL — SIGNIFICANT CHANGE UP (ref 3.8–5.2)
RBC # BLD: 4.65 M/UL — SIGNIFICANT CHANGE UP (ref 3.8–5.2)
RBC # BLD: 5.1 M/UL — SIGNIFICANT CHANGE UP (ref 3.8–5.2)
RBC # BLD: 5.25 M/UL
RBC # FLD: 15.7 %
RBC # FLD: 16.5 % — HIGH (ref 10.3–14.5)
RBC # FLD: 18.4 % — HIGH (ref 10.3–14.5)
RBC # FLD: 18.7 % — HIGH (ref 10.3–14.5)
RBC # FLD: 18.7 % — HIGH (ref 10.3–14.5)
RBC # FLD: 19.1 % — HIGH (ref 10.3–14.5)
RBC BLD AUTO: ABNORMAL
RBC CASTS # UR COMP ASSIST: HIGH (ref 0–?)
REVIEW TO FOLLOW: YES — SIGNIFICANT CHANGE UP
SAO2 % BLDA: 88.4 % — LOW (ref 95–99)
SAO2 % BLDA: 94.7 % — LOW (ref 95–99)
SAO2 % BLDV: 85.8 % — HIGH (ref 60–85)
SARS-COV-2 RNA SPEC QL NAA+PROBE: SIGNIFICANT CHANGE UP
SMUDGE CELLS # BLD: PRESENT — SIGNIFICANT CHANGE UP
SMUDGE CELLS # BLD: PRESENT — SIGNIFICANT CHANGE UP
SODIUM BLDA-SCNC: 139 MMOL/L — SIGNIFICANT CHANGE UP (ref 136–146)
SODIUM BLDA-SCNC: 142 MMOL/L — SIGNIFICANT CHANGE UP (ref 136–146)
SODIUM SERPL-SCNC: 131 MMOL/L — LOW (ref 135–145)
SODIUM SERPL-SCNC: 132 MMOL/L
SODIUM SERPL-SCNC: 133 MMOL/L — LOW (ref 135–145)
SODIUM SERPL-SCNC: 137 MMOL/L — SIGNIFICANT CHANGE UP (ref 135–145)
SODIUM SERPL-SCNC: 143 MMOL/L
SODIUM SERPL-SCNC: 146 MMOL/L — HIGH (ref 135–145)
SP GR SPEC: 1.02 — SIGNIFICANT CHANGE UP (ref 1–1.04)
SQUAMOUS # UR AUTO: SIGNIFICANT CHANGE UP
T3FREE SERPL-MCNC: 2.9 PG/ML
T4 AB SER-ACNC: 4.27 UG/DL — LOW (ref 5.1–13)
T4 FREE SERPL-MCNC: 1.2 NG/DL
TROPONIN T, HIGH SENSITIVITY: 151 NG/L — CRITICAL HIGH (ref ?–14)
TROPONIN T, HIGH SENSITIVITY: 165 NG/L — CRITICAL HIGH (ref ?–14)
TROPONIN T, HIGH SENSITIVITY: 173 NG/L — CRITICAL HIGH (ref ?–14)
TROPONIN T, HIGH SENSITIVITY: 198 NG/L — CRITICAL HIGH (ref ?–14)
TSH SERPL-ACNC: 0.24 UIU/ML
TSH SERPL-MCNC: 0.67 UIU/ML — SIGNIFICANT CHANGE UP (ref 0.27–4.2)
URATE SERPL-MCNC: 15.6 MG/DL — HIGH (ref 2.5–7)
URATE SERPL-MCNC: 7 MG/DL — SIGNIFICANT CHANGE UP (ref 2.5–7)
URATE SERPL-MCNC: 7.2 MG/DL — HIGH (ref 2.5–7)
URATE SERPL-MCNC: 7.7 MG/DL — HIGH (ref 2.5–7)
URATE SERPL-MCNC: 8.8 MG/DL — HIGH (ref 2.5–7)
URATE SERPL-MCNC: 9.3 MG/DL
UROBILINOGEN FLD QL: HIGH
VARIANT LYMPHS # BLD: 2.5 % — SIGNIFICANT CHANGE UP
WBC # BLD: 22.26 K/UL — HIGH (ref 3.8–10.5)
WBC # BLD: 57.74 K/UL — CRITICAL HIGH (ref 3.8–10.5)
WBC # BLD: 58.76 K/UL — CRITICAL HIGH (ref 3.8–10.5)
WBC # BLD: 62.68 K/UL — CRITICAL HIGH (ref 3.8–10.5)
WBC # BLD: 66.46 K/UL — CRITICAL HIGH (ref 3.8–10.5)
WBC # FLD AUTO: 21.19 K/UL
WBC # FLD AUTO: 22.26 K/UL — HIGH (ref 3.8–10.5)
WBC # FLD AUTO: 57.74 K/UL — CRITICAL HIGH (ref 3.8–10.5)
WBC # FLD AUTO: 58.76 K/UL — CRITICAL HIGH (ref 3.8–10.5)
WBC # FLD AUTO: 62.68 K/UL — CRITICAL HIGH (ref 3.8–10.5)
WBC # FLD AUTO: 66.46 K/UL — CRITICAL HIGH (ref 3.8–10.5)
WBC UR QL: SIGNIFICANT CHANGE UP (ref 0–?)

## 2020-01-01 PROCEDURE — 99223 1ST HOSP IP/OBS HIGH 75: CPT | Mod: GC

## 2020-01-01 PROCEDURE — 88341 IMHCHEM/IMCYTCHM EA ADD ANTB: CPT | Mod: 26,59

## 2020-01-01 PROCEDURE — 88305 TISSUE EXAM BY PATHOLOGIST: CPT | Mod: 26

## 2020-01-01 PROCEDURE — 36556 INSERT NON-TUNNEL CV CATH: CPT

## 2020-01-01 PROCEDURE — 76937 US GUIDE VASCULAR ACCESS: CPT | Mod: 26,76

## 2020-01-01 PROCEDURE — 36620 INSERTION CATHETER ARTERY: CPT

## 2020-01-01 PROCEDURE — 93975 VASCULAR STUDY: CPT | Mod: 26

## 2020-01-01 PROCEDURE — 99233 SBSQ HOSP IP/OBS HIGH 50: CPT | Mod: GC

## 2020-01-01 PROCEDURE — 88367 INSITU HYBRIDIZATION AUTO: CPT | Mod: 26

## 2020-01-01 PROCEDURE — 88313 SPECIAL STAINS GROUP 2: CPT | Mod: 26

## 2020-01-01 PROCEDURE — 88189 FLOWCYTOMETRY/READ 16 & >: CPT

## 2020-01-01 PROCEDURE — 74177 CT ABD & PELVIS W/CONTRAST: CPT | Mod: 26

## 2020-01-01 PROCEDURE — 31500 INSERT EMERGENCY AIRWAY: CPT

## 2020-01-01 PROCEDURE — 88360 TUMOR IMMUNOHISTOCHEM/MANUAL: CPT | Mod: 26

## 2020-01-01 PROCEDURE — 99205 OFFICE O/P NEW HI 60 MIN: CPT

## 2020-01-01 PROCEDURE — 71045 X-RAY EXAM CHEST 1 VIEW: CPT | Mod: 26

## 2020-01-01 PROCEDURE — 71275 CT ANGIOGRAPHY CHEST: CPT | Mod: 26

## 2020-01-01 PROCEDURE — 99222 1ST HOSP IP/OBS MODERATE 55: CPT

## 2020-01-01 PROCEDURE — 93010 ELECTROCARDIOGRAM REPORT: CPT

## 2020-01-01 PROCEDURE — 85097 BONE MARROW INTERPRETATION: CPT

## 2020-01-01 PROCEDURE — 99232 SBSQ HOSP IP/OBS MODERATE 35: CPT

## 2020-01-01 PROCEDURE — 93306 TTE W/DOPPLER COMPLETE: CPT | Mod: 26

## 2020-01-01 PROCEDURE — 88342 IMHCHEM/IMCYTCHM 1ST ANTB: CPT | Mod: 26,59

## 2020-01-01 PROCEDURE — 99215 OFFICE O/P EST HI 40 MIN: CPT

## 2020-01-01 PROCEDURE — 99291 CRITICAL CARE FIRST HOUR: CPT

## 2020-01-01 PROCEDURE — 88365 INSITU HYBRIDIZATION (FISH): CPT | Mod: 26,59

## 2020-01-01 RX ORDER — DEXTROSE 50 % IN WATER 50 %
25 SYRINGE (ML) INTRAVENOUS ONCE
Refills: 0 | Status: DISCONTINUED | OUTPATIENT
Start: 2020-01-01 | End: 2020-01-01

## 2020-01-01 RX ORDER — SODIUM ZIRCONIUM CYCLOSILICATE 10 G/10G
10 POWDER, FOR SUSPENSION ORAL ONCE
Refills: 0 | Status: COMPLETED | OUTPATIENT
Start: 2020-01-01 | End: 2020-01-01

## 2020-01-01 RX ORDER — HYDROCODONE BITARTRATE AND ACETAMINOPHEN 5; 300 MG/1; MG/1
5-300 TABLET ORAL
Qty: 28 | Refills: 0 | Status: ACTIVE | COMMUNITY
Start: 2020-01-01 | End: 1900-01-01

## 2020-01-01 RX ORDER — HYDROMORPHONE HYDROCHLORIDE 2 MG/ML
1 INJECTION INTRAMUSCULAR; INTRAVENOUS; SUBCUTANEOUS EVERY 4 HOURS
Refills: 0 | Status: DISCONTINUED | OUTPATIENT
Start: 2020-01-01 | End: 2020-01-01

## 2020-01-01 RX ORDER — SODIUM ZIRCONIUM CYCLOSILICATE 10 G/10G
5 POWDER, FOR SUSPENSION ORAL THREE TIMES A DAY
Refills: 0 | Status: DISCONTINUED | OUTPATIENT
Start: 2020-01-01 | End: 2020-01-01

## 2020-01-01 RX ORDER — RASBURICASE 7.5 MG
3 KIT INTRAVENOUS ONCE
Refills: 0 | Status: COMPLETED | OUTPATIENT
Start: 2020-01-01 | End: 2020-01-01

## 2020-01-01 RX ORDER — PREDNISONE 5 MG/1
5 TABLET ORAL
Qty: 30 | Refills: 3 | Status: DISCONTINUED | COMMUNITY
Start: 2019-01-01 | End: 2020-01-01

## 2020-01-01 RX ORDER — CHLORHEXIDINE GLUCONATE 213 G/1000ML
15 SOLUTION TOPICAL EVERY 12 HOURS
Refills: 0 | Status: DISCONTINUED | OUTPATIENT
Start: 2020-01-01 | End: 2020-01-01

## 2020-01-01 RX ORDER — DEXTROSE 10 % IN WATER 10 %
1000 INTRAVENOUS SOLUTION INTRAVENOUS
Refills: 0 | Status: DISCONTINUED | OUTPATIENT
Start: 2020-01-01 | End: 2020-01-01

## 2020-01-01 RX ORDER — FUROSEMIDE 40 MG
40 TABLET ORAL ONCE
Refills: 0 | Status: DISCONTINUED | OUTPATIENT
Start: 2020-01-01 | End: 2020-01-01

## 2020-01-01 RX ORDER — SODIUM BICARBONATE 1 MEQ/ML
50 SYRINGE (ML) INTRAVENOUS ONCE
Refills: 0 | Status: COMPLETED | OUTPATIENT
Start: 2020-01-01 | End: 2020-01-01

## 2020-01-01 RX ORDER — CALCIUM CHLORIDE
1 POWDER (GRAM) MISCELLANEOUS ONCE
Refills: 0 | Status: DISCONTINUED | OUTPATIENT
Start: 2020-01-01 | End: 2020-01-01

## 2020-01-01 RX ORDER — MIDAZOLAM HYDROCHLORIDE 1 MG/ML
4 INJECTION, SOLUTION INTRAMUSCULAR; INTRAVENOUS ONCE
Refills: 0 | Status: DISCONTINUED | OUTPATIENT
Start: 2020-01-01 | End: 2020-01-01

## 2020-01-01 RX ORDER — INSULIN HUMAN 100 [IU]/ML
10 INJECTION, SOLUTION SUBCUTANEOUS ONCE
Refills: 0 | Status: COMPLETED | OUTPATIENT
Start: 2020-01-01 | End: 2020-01-01

## 2020-01-01 RX ORDER — UPADACITINIB 15 MG/1
15 TABLET, EXTENDED RELEASE ORAL
Qty: 30 | Refills: 1 | Status: ACTIVE | COMMUNITY
Start: 2020-01-01

## 2020-01-01 RX ORDER — SODIUM CHLORIDE 9 MG/ML
1000 INJECTION, SOLUTION INTRAVENOUS ONCE
Refills: 0 | Status: COMPLETED | OUTPATIENT
Start: 2020-01-01 | End: 2020-01-01

## 2020-01-01 RX ORDER — MEROPENEM 1 G/30ML
1000 INJECTION INTRAVENOUS EVERY 24 HOURS
Refills: 0 | Status: DISCONTINUED | OUTPATIENT
Start: 2020-01-01 | End: 2020-01-01

## 2020-01-01 RX ORDER — DEXTROSE 50 % IN WATER 50 %
50 SYRINGE (ML) INTRAVENOUS ONCE
Refills: 0 | Status: COMPLETED | OUTPATIENT
Start: 2020-01-01 | End: 2020-01-01

## 2020-01-01 RX ORDER — HEPARIN SODIUM 5000 [USP'U]/ML
1300 INJECTION INTRAVENOUS; SUBCUTANEOUS
Qty: 25000 | Refills: 0 | Status: DISCONTINUED | OUTPATIENT
Start: 2020-01-01 | End: 2020-01-01

## 2020-01-01 RX ORDER — IBUPROFEN 200 MG
200 TABLET ORAL ONCE
Refills: 0 | Status: DISCONTINUED | OUTPATIENT
Start: 2020-01-01 | End: 2020-01-01

## 2020-01-01 RX ORDER — ACETAMINOPHEN 500 MG
1000 TABLET ORAL ONCE
Refills: 0 | Status: COMPLETED | OUTPATIENT
Start: 2020-01-01 | End: 2020-01-01

## 2020-01-01 RX ORDER — HEPARIN SODIUM 5000 [USP'U]/ML
2000 INJECTION INTRAVENOUS; SUBCUTANEOUS EVERY 6 HOURS
Refills: 0 | Status: DISCONTINUED | OUTPATIENT
Start: 2020-01-01 | End: 2020-01-01

## 2020-01-01 RX ORDER — PROPOFOL 10 MG/ML
60 INJECTION, EMULSION INTRAVENOUS ONCE
Refills: 0 | Status: COMPLETED | OUTPATIENT
Start: 2020-01-01 | End: 2020-01-01

## 2020-01-01 RX ORDER — PHENYLEPHRINE HYDROCHLORIDE 10 MG/ML
5 INJECTION INTRAVENOUS
Qty: 160 | Refills: 0 | Status: DISCONTINUED | OUTPATIENT
Start: 2020-01-01 | End: 2020-01-01

## 2020-01-01 RX ORDER — SODIUM CHLORIDE 9 MG/ML
1000 INJECTION INTRAMUSCULAR; INTRAVENOUS; SUBCUTANEOUS
Refills: 0 | Status: DISCONTINUED | OUTPATIENT
Start: 2020-01-01 | End: 2020-01-01

## 2020-01-01 RX ORDER — HYDROMORPHONE HYDROCHLORIDE 2 MG/ML
1 INJECTION INTRAMUSCULAR; INTRAVENOUS; SUBCUTANEOUS ONCE
Refills: 0 | Status: DISCONTINUED | OUTPATIENT
Start: 2020-01-01 | End: 2020-01-01

## 2020-01-01 RX ORDER — SODIUM CHLORIDE 9 MG/ML
1000 INJECTION INTRAMUSCULAR; INTRAVENOUS; SUBCUTANEOUS ONCE
Refills: 0 | Status: COMPLETED | OUTPATIENT
Start: 2020-01-01 | End: 2020-01-01

## 2020-01-01 RX ORDER — MIDAZOLAM HYDROCHLORIDE 1 MG/ML
4 INJECTION, SOLUTION INTRAMUSCULAR; INTRAVENOUS
Qty: 100 | Refills: 0 | Status: DISCONTINUED | OUTPATIENT
Start: 2020-01-01 | End: 2020-01-01

## 2020-01-01 RX ORDER — OXYCODONE HYDROCHLORIDE 5 MG/1
5 TABLET ORAL EVERY 4 HOURS
Refills: 0 | Status: DISCONTINUED | OUTPATIENT
Start: 2020-01-01 | End: 2020-01-01

## 2020-01-01 RX ORDER — HEPARIN SODIUM 5000 [USP'U]/ML
5000 INJECTION INTRAVENOUS; SUBCUTANEOUS ONCE
Refills: 0 | Status: DISCONTINUED | OUTPATIENT
Start: 2020-01-01 | End: 2020-01-01

## 2020-01-01 RX ORDER — FENTANYL CITRATE 50 UG/ML
100 INJECTION INTRAVENOUS
Qty: 2500 | Refills: 0 | Status: DISCONTINUED | OUTPATIENT
Start: 2020-01-01 | End: 2020-01-01

## 2020-01-01 RX ORDER — PROPOFOL 10 MG/ML
60 INJECTION, EMULSION INTRAVENOUS
Qty: 500 | Refills: 0 | Status: DISCONTINUED | OUTPATIENT
Start: 2020-01-01 | End: 2020-01-01

## 2020-01-01 RX ORDER — MEROPENEM 1 G/30ML
1000 INJECTION INTRAVENOUS EVERY 8 HOURS
Refills: 0 | Status: DISCONTINUED | OUTPATIENT
Start: 2020-01-01 | End: 2020-01-01

## 2020-01-01 RX ORDER — PHENYLEPHRINE HYDROCHLORIDE 10 MG/ML
0.5 INJECTION INTRAVENOUS
Qty: 40 | Refills: 0 | Status: DISCONTINUED | OUTPATIENT
Start: 2020-01-01 | End: 2020-01-01

## 2020-01-01 RX ORDER — NOREPINEPHRINE BITARTRATE/D5W 8 MG/250ML
0.05 PLASTIC BAG, INJECTION (ML) INTRAVENOUS
Qty: 16 | Refills: 0 | Status: DISCONTINUED | OUTPATIENT
Start: 2020-01-01 | End: 2020-01-01

## 2020-01-01 RX ORDER — HYDROCORTISONE 20 MG
100 TABLET ORAL EVERY 8 HOURS
Refills: 0 | Status: DISCONTINUED | OUTPATIENT
Start: 2020-01-01 | End: 2020-01-01

## 2020-01-01 RX ORDER — CHLORHEXIDINE GLUCONATE 213 G/1000ML
1 SOLUTION TOPICAL
Refills: 0 | Status: DISCONTINUED | OUTPATIENT
Start: 2020-01-01 | End: 2020-01-01

## 2020-01-01 RX ORDER — PROPOFOL 10 MG/ML
50 INJECTION, EMULSION INTRAVENOUS
Qty: 1000 | Refills: 0 | Status: DISCONTINUED | OUTPATIENT
Start: 2020-01-01 | End: 2020-01-01

## 2020-01-01 RX ORDER — LACTULOSE 10 G/15ML
20 SOLUTION ORAL ONCE
Refills: 0 | Status: COMPLETED | OUTPATIENT
Start: 2020-01-01 | End: 2020-01-01

## 2020-01-01 RX ORDER — ALBUTEROL 90 UG/1
2.5 AEROSOL, METERED ORAL THREE TIMES A DAY
Refills: 0 | Status: DISCONTINUED | OUTPATIENT
Start: 2020-01-01 | End: 2020-01-01

## 2020-01-01 RX ORDER — ALLOPURINOL 300 MG
300 TABLET ORAL DAILY
Refills: 0 | Status: DISCONTINUED | OUTPATIENT
Start: 2020-01-01 | End: 2020-01-01

## 2020-01-01 RX ORDER — SODIUM BICARBONATE 1 MEQ/ML
0.27 SYRINGE (ML) INTRAVENOUS
Qty: 150 | Refills: 0 | Status: DISCONTINUED | OUTPATIENT
Start: 2020-01-01 | End: 2020-01-01

## 2020-01-01 RX ORDER — VASOPRESSIN 20 [USP'U]/ML
0.04 INJECTION INTRAVENOUS
Qty: 50 | Refills: 0 | Status: DISCONTINUED | OUTPATIENT
Start: 2020-01-01 | End: 2020-01-01

## 2020-01-01 RX ORDER — FUROSEMIDE 40 MG
40 TABLET ORAL ONCE
Refills: 0 | Status: COMPLETED | OUTPATIENT
Start: 2020-01-01 | End: 2020-01-01

## 2020-01-01 RX ORDER — SODIUM ZIRCONIUM CYCLOSILICATE 10 G/10G
10 POWDER, FOR SUSPENSION ORAL
Refills: 0 | Status: DISCONTINUED | OUTPATIENT
Start: 2020-01-01 | End: 2020-01-01

## 2020-01-01 RX ORDER — ALBUTEROL 90 UG/1
10 AEROSOL, METERED ORAL ONCE
Refills: 0 | Status: COMPLETED | OUTPATIENT
Start: 2020-01-01 | End: 2020-01-01

## 2020-01-01 RX ORDER — MORPHINE SULFATE 50 MG/1
4 CAPSULE, EXTENDED RELEASE ORAL ONCE
Refills: 0 | Status: DISCONTINUED | OUTPATIENT
Start: 2020-01-01 | End: 2020-01-01

## 2020-01-01 RX ORDER — POLYETHYLENE GLYCOL 3350 17 G/17G
17 POWDER, FOR SOLUTION ORAL
Refills: 0 | Status: DISCONTINUED | OUTPATIENT
Start: 2020-01-01 | End: 2020-01-01

## 2020-01-01 RX ORDER — FENTANYL CITRATE 50 UG/ML
100 INJECTION INTRAVENOUS ONCE
Refills: 0 | Status: DISCONTINUED | OUTPATIENT
Start: 2020-01-01 | End: 2020-01-01

## 2020-01-01 RX ORDER — HEPARIN SODIUM 5000 [USP'U]/ML
INJECTION INTRAVENOUS; SUBCUTANEOUS
Qty: 25000 | Refills: 0 | Status: DISCONTINUED | OUTPATIENT
Start: 2020-01-01 | End: 2020-01-01

## 2020-01-01 RX ORDER — PREDNISONE 20 MG/1
20 TABLET ORAL
Qty: 90 | Refills: 1 | Status: ACTIVE | COMMUNITY
Start: 2020-01-01 | End: 1900-01-01

## 2020-01-01 RX ORDER — ALBUTEROL 90 UG/1
10 AEROSOL, METERED ORAL ONCE
Refills: 0 | Status: DISCONTINUED | OUTPATIENT
Start: 2020-01-01 | End: 2020-01-01

## 2020-01-01 RX ORDER — MIDAZOLAM HYDROCHLORIDE 1 MG/ML
2 INJECTION, SOLUTION INTRAMUSCULAR; INTRAVENOUS ONCE
Refills: 0 | Status: DISCONTINUED | OUTPATIENT
Start: 2020-01-01 | End: 2020-01-01

## 2020-01-01 RX ORDER — RASBURICASE 7.5 MG
6 KIT INTRAVENOUS ONCE
Refills: 0 | Status: COMPLETED | OUTPATIENT
Start: 2020-01-01 | End: 2020-01-01

## 2020-01-01 RX ORDER — HEPARIN SODIUM 5000 [USP'U]/ML
4500 INJECTION INTRAVENOUS; SUBCUTANEOUS EVERY 6 HOURS
Refills: 0 | Status: DISCONTINUED | OUTPATIENT
Start: 2020-01-01 | End: 2020-01-01

## 2020-01-01 RX ORDER — CALCIUM GLUCONATE 100 MG/ML
1 VIAL (ML) INTRAVENOUS ONCE
Refills: 0 | Status: COMPLETED | OUTPATIENT
Start: 2020-01-01 | End: 2020-01-01

## 2020-01-01 RX ORDER — HEPARIN SODIUM 5000 [USP'U]/ML
4500 INJECTION INTRAVENOUS; SUBCUTANEOUS ONCE
Refills: 0 | Status: COMPLETED | OUTPATIENT
Start: 2020-01-01 | End: 2020-01-01

## 2020-01-01 RX ORDER — KETOROLAC TROMETHAMINE 30 MG/ML
15 SYRINGE (ML) INJECTION ONCE
Refills: 0 | Status: DISCONTINUED | OUTPATIENT
Start: 2020-01-01 | End: 2020-01-01

## 2020-01-01 RX ORDER — HYDROMORPHONE HYDROCHLORIDE 2 MG/ML
0.5 INJECTION INTRAMUSCULAR; INTRAVENOUS; SUBCUTANEOUS ONCE
Refills: 0 | Status: DISCONTINUED | OUTPATIENT
Start: 2020-01-01 | End: 2020-01-01

## 2020-01-01 RX ORDER — MIDAZOLAM HYDROCHLORIDE 1 MG/ML
0.02 INJECTION, SOLUTION INTRAMUSCULAR; INTRAVENOUS
Qty: 100 | Refills: 0 | Status: DISCONTINUED | OUTPATIENT
Start: 2020-01-01 | End: 2020-01-01

## 2020-01-01 RX ORDER — TOFACITINIB 11 MG/1
11 TABLET, FILM COATED, EXTENDED RELEASE ORAL
Qty: 30 | Refills: 11 | Status: DISCONTINUED | COMMUNITY
Start: 2018-10-11 | End: 2020-01-01

## 2020-01-01 RX ORDER — SENNA PLUS 8.6 MG/1
2 TABLET ORAL AT BEDTIME
Refills: 0 | Status: DISCONTINUED | OUTPATIENT
Start: 2020-01-01 | End: 2020-01-01

## 2020-01-01 RX ORDER — FENTANYL CITRATE 50 UG/ML
0.5 INJECTION INTRAVENOUS
Qty: 2500 | Refills: 0 | Status: DISCONTINUED | OUTPATIENT
Start: 2020-01-01 | End: 2020-01-01

## 2020-01-01 RX ORDER — LIDOCAINE HCL 20 MG/ML
20 VIAL (ML) INJECTION ONCE
Refills: 0 | Status: DISCONTINUED | OUTPATIENT
Start: 2020-01-01 | End: 2020-01-01

## 2020-01-01 RX ORDER — SODIUM CHLORIDE 9 MG/ML
10 INJECTION INTRAMUSCULAR; INTRAVENOUS; SUBCUTANEOUS
Refills: 0 | Status: DISCONTINUED | OUTPATIENT
Start: 2020-01-01 | End: 2020-01-01

## 2020-01-01 RX ADMIN — Medication 50 MILLILITER(S): at 14:33

## 2020-01-01 RX ADMIN — SODIUM CHLORIDE 200 MILLILITER(S): 9 INJECTION INTRAMUSCULAR; INTRAVENOUS; SUBCUTANEOUS at 14:12

## 2020-01-01 RX ADMIN — SODIUM CHLORIDE 1000 MILLILITER(S): 9 INJECTION, SOLUTION INTRAVENOUS at 14:49

## 2020-01-01 RX ADMIN — Medication 100 GRAM(S): at 14:58

## 2020-01-01 RX ADMIN — PHENYLEPHRINE HYDROCHLORIDE 53.2 MICROGRAM(S)/KG/MIN: 10 INJECTION INTRAVENOUS at 23:38

## 2020-01-01 RX ADMIN — HEPARIN SODIUM 1300 UNIT(S)/HR: 5000 INJECTION INTRAVENOUS; SUBCUTANEOUS at 13:23

## 2020-01-01 RX ADMIN — Medication 300 MILLIGRAM(S): at 10:40

## 2020-01-01 RX ADMIN — SODIUM CHLORIDE 1000 MILLILITER(S): 9 INJECTION INTRAMUSCULAR; INTRAVENOUS; SUBCUTANEOUS at 12:14

## 2020-01-01 RX ADMIN — MORPHINE SULFATE 4 MILLIGRAM(S): 50 CAPSULE, EXTENDED RELEASE ORAL at 16:51

## 2020-01-01 RX ADMIN — Medication 50 MILLIEQUIVALENT(S): at 02:47

## 2020-01-01 RX ADMIN — HEPARIN SODIUM 1300 UNIT(S)/HR: 5000 INJECTION INTRAVENOUS; SUBCUTANEOUS at 06:31

## 2020-01-01 RX ADMIN — SODIUM CHLORIDE 1000 MILLILITER(S): 9 INJECTION INTRAMUSCULAR; INTRAVENOUS; SUBCUTANEOUS at 02:47

## 2020-01-01 RX ADMIN — Medication 50 MILLILITER(S): at 23:37

## 2020-01-01 RX ADMIN — HYDROMORPHONE HYDROCHLORIDE 1 MILLIGRAM(S): 2 INJECTION INTRAMUSCULAR; INTRAVENOUS; SUBCUTANEOUS at 00:02

## 2020-01-01 RX ADMIN — SODIUM ZIRCONIUM CYCLOSILICATE 5 GRAM(S): 10 POWDER, FOR SUSPENSION ORAL at 23:37

## 2020-01-01 RX ADMIN — Medication 100 GRAM(S): at 19:06

## 2020-01-01 RX ADMIN — Medication 400 MILLIGRAM(S): at 19:30

## 2020-01-01 RX ADMIN — Medication 2.66 MICROGRAM(S)/KG/MIN: at 00:01

## 2020-01-01 RX ADMIN — Medication 100 MILLIGRAM(S): at 01:00

## 2020-01-01 RX ADMIN — Medication 50 MILLILITER(S): at 23:20

## 2020-01-01 RX ADMIN — SODIUM CHLORIDE 1000 MILLILITER(S): 9 INJECTION INTRAMUSCULAR; INTRAVENOUS; SUBCUTANEOUS at 19:12

## 2020-01-01 RX ADMIN — RASBURICASE 104 MILLIGRAM(S): KIT at 18:27

## 2020-01-01 RX ADMIN — SODIUM CHLORIDE 1000 MILLILITER(S): 9 INJECTION, SOLUTION INTRAVENOUS at 20:11

## 2020-01-01 RX ADMIN — OXYCODONE HYDROCHLORIDE 5 MILLIGRAM(S): 5 TABLET ORAL at 08:30

## 2020-01-01 RX ADMIN — MIDAZOLAM HYDROCHLORIDE 1.13 MG/KG/HR: 1 INJECTION, SOLUTION INTRAMUSCULAR; INTRAVENOUS at 23:38

## 2020-01-01 RX ADMIN — MORPHINE SULFATE 4 MILLIGRAM(S): 50 CAPSULE, EXTENDED RELEASE ORAL at 17:00

## 2020-01-01 RX ADMIN — FENTANYL CITRATE 2.84 MICROGRAM(S)/KG/HR: 50 INJECTION INTRAVENOUS at 23:36

## 2020-01-01 RX ADMIN — HEPARIN SODIUM 1300 UNIT(S)/HR: 5000 INJECTION INTRAVENOUS; SUBCUTANEOUS at 23:36

## 2020-01-01 RX ADMIN — HEPARIN SODIUM 4500 UNIT(S): 5000 INJECTION INTRAVENOUS; SUBCUTANEOUS at 06:45

## 2020-01-01 RX ADMIN — SENNA PLUS 2 TABLET(S): 8.6 TABLET ORAL at 00:02

## 2020-01-01 RX ADMIN — Medication 1000 MILLIGRAM(S): at 02:20

## 2020-01-01 RX ADMIN — Medication 50 MILLIEQUIVALENT(S): at 21:24

## 2020-01-01 RX ADMIN — Medication 50 MILLILITER(S): at 23:00

## 2020-01-01 RX ADMIN — MIDAZOLAM HYDROCHLORIDE 2 MILLIGRAM(S): 1 INJECTION, SOLUTION INTRAMUSCULAR; INTRAVENOUS at 21:20

## 2020-01-01 RX ADMIN — INSULIN HUMAN 10 UNIT(S): 100 INJECTION, SOLUTION SUBCUTANEOUS at 14:46

## 2020-01-01 RX ADMIN — SODIUM CHLORIDE 1000 MILLILITER(S): 9 INJECTION INTRAMUSCULAR; INTRAVENOUS; SUBCUTANEOUS at 14:21

## 2020-01-01 RX ADMIN — HEPARIN SODIUM 4500 UNIT(S): 5000 INJECTION INTRAVENOUS; SUBCUTANEOUS at 23:09

## 2020-01-01 RX ADMIN — Medication 15 MILLIGRAM(S): at 06:27

## 2020-01-01 RX ADMIN — INSULIN HUMAN 10 UNIT(S): 100 INJECTION, SOLUTION SUBCUTANEOUS at 19:06

## 2020-01-01 RX ADMIN — Medication 100 GRAM(S): at 21:30

## 2020-01-01 RX ADMIN — MIDAZOLAM HYDROCHLORIDE 4 MILLIGRAM(S): 1 INJECTION, SOLUTION INTRAMUSCULAR; INTRAVENOUS at 20:00

## 2020-01-01 RX ADMIN — MEROPENEM 100 MILLIGRAM(S): 1 INJECTION INTRAVENOUS at 00:24

## 2020-01-01 RX ADMIN — HEPARIN SODIUM 1100 UNIT(S)/HR: 5000 INJECTION INTRAVENOUS; SUBCUTANEOUS at 23:09

## 2020-01-01 RX ADMIN — Medication 15 MILLIGRAM(S): at 06:55

## 2020-01-01 RX ADMIN — MEROPENEM 100 MILLIGRAM(S): 1 INJECTION INTRAVENOUS at 09:50

## 2020-01-01 RX ADMIN — HYDROMORPHONE HYDROCHLORIDE 1 MILLIGRAM(S): 2 INJECTION INTRAMUSCULAR; INTRAVENOUS; SUBCUTANEOUS at 13:32

## 2020-01-01 RX ADMIN — Medication 50 MILLILITER(S): at 14:10

## 2020-01-01 RX ADMIN — Medication 40 MILLIGRAM(S): at 21:30

## 2020-01-01 RX ADMIN — Medication 100 GRAM(S): at 14:45

## 2020-01-01 RX ADMIN — SODIUM ZIRCONIUM CYCLOSILICATE 10 GRAM(S): 10 POWDER, FOR SUSPENSION ORAL at 01:49

## 2020-01-01 RX ADMIN — FENTANYL CITRATE 100 MICROGRAM(S): 50 INJECTION INTRAVENOUS at 21:30

## 2020-01-01 RX ADMIN — HYDROMORPHONE HYDROCHLORIDE 1 MILLIGRAM(S): 2 INJECTION INTRAMUSCULAR; INTRAVENOUS; SUBCUTANEOUS at 01:30

## 2020-01-01 RX ADMIN — RASBURICASE 108 MILLIGRAM(S): KIT at 16:53

## 2020-01-01 RX ADMIN — Medication 100 MEQ/KG/HR: at 01:00

## 2020-01-01 RX ADMIN — POLYETHYLENE GLYCOL 3350 17 GRAM(S): 17 POWDER, FOR SOLUTION ORAL at 06:28

## 2020-01-01 RX ADMIN — MEROPENEM 100 MILLIGRAM(S): 1 INJECTION INTRAVENOUS at 23:37

## 2020-01-01 RX ADMIN — Medication 50 MILLILITER(S): at 19:06

## 2020-01-01 RX ADMIN — SODIUM ZIRCONIUM CYCLOSILICATE 10 GRAM(S): 10 POWDER, FOR SUSPENSION ORAL at 19:11

## 2020-01-01 RX ADMIN — SODIUM CHLORIDE 125 MILLILITER(S): 9 INJECTION INTRAMUSCULAR; INTRAVENOUS; SUBCUTANEOUS at 01:49

## 2020-01-01 RX ADMIN — PROPOFOL 17 MICROGRAM(S)/KG/MIN: 10 INJECTION, EMULSION INTRAVENOUS at 23:37

## 2020-01-01 RX ADMIN — FENTANYL CITRATE 100 MICROGRAM(S): 50 INJECTION INTRAVENOUS at 20:59

## 2020-01-01 RX ADMIN — PROPOFOL 60 MILLIGRAM(S): 10 INJECTION, EMULSION INTRAVENOUS at 20:59

## 2020-01-01 RX ADMIN — LACTULOSE 20 GRAM(S): 10 SOLUTION ORAL at 23:09

## 2020-01-01 RX ADMIN — OXYCODONE HYDROCHLORIDE 5 MILLIGRAM(S): 5 TABLET ORAL at 09:30

## 2020-01-01 RX ADMIN — HYDROMORPHONE HYDROCHLORIDE 1 MILLIGRAM(S): 2 INJECTION INTRAMUSCULAR; INTRAVENOUS; SUBCUTANEOUS at 18:21

## 2020-01-01 RX ADMIN — Medication 50 MILLILITER(S): at 02:47

## 2020-06-02 PROBLEM — E05.20 GOITER, TOXIC, MULTINODULAR: Status: ACTIVE | Noted: 2018-10-11

## 2020-06-02 PROBLEM — R01.1 SYSTOLIC MURMUR: Status: ACTIVE | Noted: 2020-01-01

## 2020-06-02 PROBLEM — M06.9 ARTHRITIS, RHEUMATOID: Status: ACTIVE | Noted: 2018-09-12

## 2020-07-24 PROBLEM — Z85.820 HISTORY OF MALIGNANT MELANOMA OF SKIN: Status: ACTIVE | Noted: 2018-09-12

## 2020-07-27 PROBLEM — R63.4 WEIGHT LOSS: Status: ACTIVE | Noted: 2020-01-01

## 2020-07-27 PROBLEM — R50.9 FEVER, UNSPECIFIED FEVER CAUSE: Status: ACTIVE | Noted: 2020-01-01

## 2020-07-27 PROBLEM — R61 NIGHT SWEATS: Status: ACTIVE | Noted: 2020-01-01

## 2020-07-27 PROBLEM — R59.1 LYMPHADENOPATHY: Status: ACTIVE | Noted: 2020-01-01

## 2020-07-27 PROBLEM — D72.829 ELEVATED WHITE BLOOD CELL COUNT: Status: ACTIVE | Noted: 2018-10-11

## 2020-07-27 PROBLEM — D72.829 LEUKOCYTOSIS, UNSPECIFIED TYPE: Status: ACTIVE | Noted: 2018-10-11

## 2020-07-27 NOTE — ED ADULT NURSE REASSESSMENT NOTE - NS ED NURSE REASSESS COMMENT FT1
Pt returned from CT Scan. #[id number] IV placed in L wrist at this time. Lab work drawn as ordered. Pt placed on cardiac monitor. Pt relates pain in lower back, MD made aware. Pt updated on plan of care, verbalized understanding. Will continue to monitor.

## 2020-07-27 NOTE — H&P ADULT - PROBLEM SELECTOR PLAN 4
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 Hx positive for abd pain and scleral icterus x 2 weeks. DDx includes cholecystitis vs. choledocholithiases vs. obstruction from tumor burden vs. possible PVT/Budd-Chiari syndrome in setting of lymphoma. Low c/f cholangitis as no fever.   -Tb 9.4, Alk phos 1191, ,  on presentation  -CTAP w/ GB wall thickening  - start empiric meropenem IV r/o acute cholecystitis   -f/u RUQ US w/ doppler to r/o hepatic vein vs. PVT   -CTM LFTs   -consider MRCP / GI consult for further eval if RUQ US neg  -Surgical curbsided for GB edema, recommend f/u RUQ  -On Heparin gtt for possible Budd-Chiari and aortic thrombus (as below)

## 2020-07-27 NOTE — CONSULT NOTE ADULT - SUBJECTIVE AND OBJECTIVE BOX
Ms. Allison, a 50-year-old woman with hx of RA was sent from [facility] due to concern for agressive lymphoma with tumor lysis. Pt has long history of RA and has taken immunosupressive medications- Embrel, Xeljanz for several years. Lastly she was on Rinvoc for the past few weeks. She is now being evaluated for mediastinal and upper abdominal LAD with constitutional symptoms. The possibility of lymphoma is high. Pt saw Dr. Leal (hematologist) as outpt and was concerned for aggressive lymphoma with tumor lysis syndrome  and was sent to the hospital for further management.      Allergies  penicillin (Unknown)    PAST MEDICAL & SURGICAL HISTORY:  Rheumatoid arthritis    FAMILY HISTORY:  No pertinent medical history    Social History:  current smoker    REVIEW OF SYSTEMS:    CONSTITUTIONAL: No weakness, fevers or chills  EYES/ENT: No visual changes, no throat pain   RESPIRATORY: No cough, wheezing, hemoptysis; No shortness of breath  CARDIOVASCULAR: No chest pain or palpitations  GASTROINTESTINAL: No abdominal pain, nausea, vomiting, or hematemesis; No diarrhea or constipation. No melena or hematochezia.  GENITOURINARY: No dysuria, frequency or hematuria  MUSCULOSKELETAL: No joint pain.  NEUROLOGICAL: No dizziness, numbness, or weakness  SKIN: No itching, burning, rashes, or lesions   All other review of systems is negative unless indicated above.    VITAL SIGNS:  Vital Signs Last 24 Hrs  T(C): 37.2 (27 Jul 2020 11:12), Max: 37.2 (27 Jul 2020 11:12)  T(F): 98.9 (27 Jul 2020 11:12), Max: 98.9 (27 Jul 2020 11:12)  HR: 110 (27 Jul 2020 12:15) (110 - 122)  BP: 125/52 (27 Jul 2020 12:15) (101/57 - 125/52)  BP(mean): --  RR: 18 (27 Jul 2020 12:15) (18 - 18)  SpO2: 97% (27 Jul 2020 12:15) (95% - 97%)      PHYSICAL EXAM:     GENERAL: no acute distress  HEENT: EOMI, neck supple  RESPIRATORY: LCTAB/L, no rhonchi, rales, or wheezing  CARDIOVASCULAR: RRR, no murmurs, gallops, rubs  ABDOMINAL: soft, non-tender, non-distended, positive bowel sounds   EXTREMITIES: no clubbing, cyanosis, or edema  NEUROLOGICAL: alert and oriented x 3, non-focal  SKIN: no rashes or lesions   MUSCULOSKELETAL: no gross joint deformity      MEDICATIONS  (STANDING):  Rinvoq  prednisone 20 mg  qd  tramadol 50 mg BID prn

## 2020-07-27 NOTE — H&P ADULT - NSHPREVIEWOFSYSTEMS_GEN_ALL_CORE
REVIEW OF SYSTEMS:    CONSTITUTIONAL: No weakness, fevers or chills  EYES/ENT: No visual changes;  No vertigo or throat pain   NECK: No pain or stiffness  RESPIRATORY: No cough, wheezing, hemoptysis; No shortness of breath  CARDIOVASCULAR: No chest pain or palpitations  GASTROINTESTINAL: No abdominal or epigastric pain. No nausea, vomiting, or hematemesis; No diarrhea or constipation. No melena or hematochezia.  GENITOURINARY: No dysuria, frequency or hematuria  NEUROLOGICAL: No numbness or weakness  SKIN: No itching, burning, rashes, or lesions   All other review of systems is negative unless indicated above. REVIEW OF SYSTEMS:    CONSTITUTIONAL: per HPI   EYES/ENT: No visual changes;  No vertigo or throat pain   NECK: No pain or stiffness  RESPIRATORY: No cough, wheezing, hemoptysis, +SOB   CARDIOVASCULAR: No chest pain or palpitations  GASTROINTESTINAL: per HPI   GENITOURINARY: No dysuria, frequency or hematuria  NEUROLOGICAL: No numbness or weakness  SKIN: inc ecchymosis   All other review of systems is negative unless indicated above.

## 2020-07-27 NOTE — ED PROVIDER NOTE - PROGRESS NOTE DETAILS
PGY1 Gabriel Murrieta: Pt resting in bed. Point of care ultrasound in progress. Lab work reviewed and discussed case w/ heme/onc fellow. Rasburicase ordered for uric acid of 16 per heme/onc recommendation. Continuing IV hydration. Treatment of hyperkalemia initiated. PGY1 Gabriel Murrieta: Pt resting in bed. Point of care ultrasound in progress. Lab work reviewed and discussed case w/ heme/onc fellow. Rasburicase ordered by heme/onc for uric acid of 16. No need for nephrology consult at this time per heme/onc recommendation. Continuing IV hydration. Treatment of hyperkalemia initiated. Imaging pending. Bree: K 6.9, likely TLS. MICU consult. IVF. PGY 1 Gabriel Murrieta: Pt at CT imaging. Repeat BMP pending. PGY 1 Gabriel Murrieta: CT imaging back. Paging vascular for possible aortic thrombus. repeat BMP pending. PGY 1 Gabriel Murrieta: paged and awaiting call back from vascular surgery. BMP repeat pending.

## 2020-07-27 NOTE — H&P ADULT - PROBLEM SELECTOR PLAN 10
DVT: hep gtt   Diet: low K diet   Code: full code DVT: hep gtt   Diet: low K diet, NPO after MN for RUQ US   Code: full code

## 2020-07-27 NOTE — CONSULT NOTE ADULT - ASSESSMENT
50F w/ pmh of RA (on Rinvoq since 6/2020, has been on multiple other immunosuppressive drugs in the past) admitted for suspected aggressive lymphoma and tumor lysis syndrome, CTAP w/ IV contrast revealing infrarenal abdominal aorta thrombus occupying 50% of the aortic diameter.     - Will discuss with Dr. Boateng    Vascular Surgery (C Team Surgery)  [id number] with any questions 50F w/ pmh of RA (on Rinvoq since 6/2020, has been on multiple other immunosuppressive drugs in the past) admitted for suspected aggressive lymphoma and tumor lysis syndrome, CTAP w/ IV contrast revealing infrarenal abdominal aorta thrombus occupying 50% of the aortic diameter.     - Recommend therapeutic lovenox for anticoagulation  - Care per primary team  - Discussed with Dr. Schneider - Vascular fellow    Vascular Surgery (C Team Surgery)  [id number] with any questions 50F w/ pmh of RA (on Rinvoq since 6/2020, has been on multiple other immunosuppressive drugs in the past) admitted for suspected aggressive lymphoma and tumor lysis syndrome, CTAP w/ IV contrast revealing infrarenal abdominal aorta thrombus occupying 50% of the aortic diameter.     - Recommend therapeutic lovenox for anticoagulation if no medical contraindication to anticoagulation  - Care per primary team  - Discussed with Dr. Schneider - Vascular fellow    Vascular Surgery (C Team Surgery)  [id number] with any questions 50F w/ pmh of RA (on Rinvoq since 6/2020, has been on multiple other immunosuppressive drugs in the past) admitted for suspected aggressive lymphoma and tumor lysis syndrome, CTAP w/ IV contrast revealing infrarenal abdominal aorta thrombus occupying 50% of the aortic diameter.     - Recommend therapeutic lovenox for anticoagulation if no medical contraindication to anticoagulation  no indication  for any vasc surg intervention in this pt w a paraneoplastic syndrome   - Care per primary team  - Discussed with Dr. Schneider - Vascular fellow  will follow

## 2020-07-27 NOTE — ED PROVIDER NOTE - OBJECTIVE STATEMENT
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 Pt has long history of RA and has taken immunosupressive medications- Embrel, Xeljanz for several years. Lastly she was on Rinvoc for the past few weeks. She is now being evaluated for mediastinal and upper abdominal LAD with constitutional symptoms. The possibility of lymphoma is high. Pt saw Dr. Leal (hematologist) as outpt and was concerned for aggressive lymphoma with tumor lysis syndrome  and was sent to the hospital for further management. 51 yo F w/ hx of RA and recent dx of lymphoma on 7/16/20, w/ mediastinal/thoracic masses, sent in by Dr. Yu today, oncology, w/ concern for tumor lysis syndrome 2/2 to aggressive lymphoma. Pt states that she was initially seen on 7/16/20 in urgent care for SOB. Since then she has been developing low grade fevers, myalgias, and general malaise. She also reports increasing abd pain, most notably in her upper abd and bilateral flanks, and last BM was 3 days ago. Pt has been prescribed Vicodin daily for pain, and she has not yet initiated chemotherapy.     Of note, patient has long history of RA and immunosupressive medication use- Embrel, Xeljanz for several years, and Rinvoc for the past few weeks. Her Rinvoc has been discontinued, and she is now taking 20 mg prednisone daily which she has been on for approximately 1 year. Today pt denies HA, vision changes, lightheadedness, significant cough, congestion, CP, LE edema, decreased urination, hematuria, dysuria. 49 yo F w/ hx of RA and recent dx of lymphoma on 7/16/20, w/ mediastinal/thoracic masses, sent in by Dr. Yu today, oncology, w/ concern for tumor lysis syndrome 2/2 to aggressive lymphoma. Pt states that she was initially seen on 7/16/20 in urgent care for SOB. Since then she has been developing low grade fevers, myalgias, and general malaise. She also reports increasing abd pain, most notably in her upper abd and bilateral flanks, and last BM was 3 days ago. Pt has been prescribed Vicodin daily for pain, and she has not yet initiated chemotherapy. Prior to recent developments, patient has long history of RA and immunosupressive medication use- Embrel, Xeljanz for several years, and Rinvoc for the past few weeks. Her Rinvoc has been discontinued, and she is now taking 20 mg prednisone daily which she has been on for approximately 1 year. Today pt denies HA, vision changes, lightheadedness, significant cough, congestion, CP, LE edema, decreased urination, hematuria, dysuria.

## 2020-07-27 NOTE — CONSULT NOTE ADULT - ASSESSMENT
Ms. Allison, a 50-year-old woman with hx of RA was sent from [facility] due to concern for aggressive lymphoma with tumor lysis.    # r/o Lymphoma  -CT from outside showed  diffuse Mediastinal and upper abd LAD  -pt outpt labs showed uric acid of 9.3,   -will repeat CT c/a/p   -Will need for excisional/core biopsy of LN followed by PET and bone marrow.   -check TLS (K, Cr, Ca, phosphate, LDH, uric acid)  labs every daily and DIC (D-dimer, PT, PTT, Fibrinogen ) daily.   -please give IV fluid at 150cc/hr  -Give rasburicase 3mg IV for uric acid >9 and 6mg for uric acid >12      Roddy Mandujano MD. PGY 6  Heme-Onc fellow  [phone]; [id number] Ms. Allison, a 50-year-old woman with hx of RA was sent from [facility] due to concern for aggressive lymphoma with tumor lysis.    # r/o Lymphoma  -CT from outside showed  diffuse Mediastinal and upper abd LAD  -pt outpt labs showed uric acid of 9.3,   -will repeat CT c/a/p   -Will need for excisional/core biopsy of LN followed by PET and bone marrow.   -check TLS (K, Cr, Ca, phosphate, LDH, uric acid)  labs [id number] and DIC (D-dimer, PT, PTT, Fibrinogen ) daily.   -please give IV fluid at 150cc/hr  -Give rasburicase 3mg IV for uric acid >9 and 6mg for uric acid >12      Roddy Mandujano MD. PGY 6  Heme-Onc fellow  [phone]; [id number] Ms. Allison, a 50-year-old woman with hx of RA was sent from [facility] due to concern for aggressive lymphoma with tumor lysis.    # r/o Lymphoma  -CT from outside showed  diffuse Mediastinal and upper abd LAD  -pt outpt labs showed uric acid of 9.3,  which are worsened in the ED to uric acid >16, LDH>1000, fibrinogen 129  -repeat CT c/a/p   -Will need for excisional/core biopsy of LN followed by PET and bone marrow.   -pt appears to be in spontaneous tumor lysis syndrome from likely underlying lymphoma   -please check TLS (K, Cr, Ca, phosphate, LDH, uric acid)  labs [id number] and DIC (D-dimer, PT, PTT, Fibrinogen ) q8hrs.  -please give IV fluid at 150cc/hr  -Give rasburicase 6mg IV  uric acid >12  -correct electrolyses and supportive care. Cryoprecipitate for fibrinogen < 100    # Infrarenal aorta thrombus  - heparin gtt          Roddy Mandujano MD. PGY 6  Heme-Onc fellow  [phone]; [id number]

## 2020-07-27 NOTE — H&P ADULT - ASSESSMENT
51 yo female with a PMHx of RA (on prednisone and Rinvoq 6/2020), melanoma in situ s/p excision, multinodular goiter w/ recent CTAP raising c/f lymphoma, found to have tumor lysis syndrome c/b infrarenal aortic thrombosis, transaminitis and new thrombocytopenia 49 yo female with a PMHx of RA (on prednisone and Rinvoq 6/2020), melanoma in situ s/p excision, multinodular goiter w/ recent CTAP raising c/f lymphoma, found to have tumor lysis syndrome w/ hyperkalemia and hyperacute T waves, c/b infrarenal aortic thrombosis, transaminitis and new thrombocytopenia 49 yo female with a PMHx of RA (on prednisone and Rinvoq 6/2020), melanoma in situ s/p excision, multinodular goiter w/ recent CTAP raising c/f lymphoma, found to have tumor lysis syndrome w/ hyperkalemia c/b hyperacute T waves, infrarenal aortic thrombosis, transaminitis and new thrombocytopenia; 51 yo female with a PMHx of RA (on prednisone and Rinvoq 6/2020), melanoma in situ s/p excision, multinodular goiter w/ recent CTAP raising c/f lymphoma, found to have tumor lysis syndrome w/ hyperkalemia c/b hyperacute T waves, infrarenal aortic thrombosis, transaminitis and new thrombocytopenia

## 2020-07-27 NOTE — H&P ADULT - NSHPLABSRESULTS_GEN_ALL_CORE
132<L>  |  96<L>  |  92<H>  ----------------------------<  87  6.3<HH>   |  16<L>  |  0.97      131<L>  |  94<L>  |  93<H>  ----------------------------<  106<H>  6.9<HH>   |  13<L>  |  0.99    Ca    8.5      2020 16:40  Ca    8.8      2020 12:17  Phos  3.6       Mg     2.7         TPro  4.9<L>  /  Alb  2.6<L>  /  TBili  9.4<H>  /  DBili  x   /  AST  147<H>  /  ALT  130<H>  /  AlkPhos  1191<H>      Magnesium, Serum: 2.7 mg/dL (20 @ 16:40)    Phosphorus Level, Serum: 3.6 mg/dL (20 @ 16:40)  Phosphorus Level, Serum: 4.4 mg/dL (20 @ 13:27)      PT/INR - ( 2020 13:27 )   PT: 15.8 SEC;   INR: 1.37          PTT - ( 2020 13:27 )  PTT:31.3 SEC              Urinalysis Basic - ( 2020 15:00 )    Color: DARK YELLOW / Appearance: Lt TURBID / S.022 / pH: 5.0  Gluc: NEGATIVE / Ketone: NEGATIVE  / Bili: SMALL / Urobili: MODERATE   Blood: NEGATIVE / Protein: 30 / Nitrite: NEGATIVE   Leuk Esterase: NEGATIVE / RBC: 6-10 / WBC 3-5   Sq Epi: OCC / Non Sq Epi: x / Bacteria: NEGATIVE                              13.4   57.74 )-----------( 111      ( 2020 12:17 )             41.1     CAPILLARY BLOOD GLUCOSE      POCT Blood Glucose.: 92 mg/dL (2020 14:30)      The Labs were reviewed by me   The Radiology was reviewed by me    EKG tracing reviewed by me     132<L>  |  96<L>  |  92<H>  ----------------------------<  87  6.3<HH>   |  16<L>  |  0.97      131<L>  |  94<L>  |  93<H>  ----------------------------<  106<H>  6.9<HH>   |  13<L>  |  0.99    Ca    8.5      2020 16:40  Ca    8.8      2020 12:17  Phos  3.6       Mg     2.7         TPro  4.9<L>  /  Alb  2.6<L>  /  TBili  9.4<H>  /  DBili  x   /  AST  147<H>  /  ALT  130<H>  /  AlkPhos  1191<H>      Magnesium, Serum: 2.7 mg/dL (20 @ 16:40)    Phosphorus Level, Serum: 3.6 mg/dL (20 @ 16:40)  Phosphorus Level, Serum: 4.4 mg/dL (20 @ 13:27)      PT/INR - ( 2020 13:27 )   PT: 15.8 SEC;   INR: 1.37          PTT - ( 2020 13:27 )  PTT:31.3 SEC              Urinalysis Basic - ( 2020 15:00 )    Color: DARK YELLOW / Appearance: Lt TURBID / S.022 / pH: 5.0  Gluc: NEGATIVE / Ketone: NEGATIVE  / Bili: SMALL / Urobili: MODERATE   Blood: NEGATIVE / Protein: 30 / Nitrite: NEGATIVE   Leuk Esterase: NEGATIVE / RBC: 6-10 / WBC 3-5   Sq Epi: OCC / Non Sq Epi: x / Bacteria: NEGATIVE                              13.4   57.74 )-----------( 111      ( 2020 12:17 )             41.1     CAPILLARY BLOOD GLUCOSE      POCT Blood Glucose.: 92 mg/dL (2020 14:30)    EKG w/ sinus tachycardia 110; TWI in V1, 1mm J point elevation V3, poor BL V4      The Labs were reviewed by me   The Radiology was reviewed by me    EKG tracing reviewed by me     132<L>  |  96<L>  |  92<H>  ----------------------------<  87  6.3<HH>   |  16<L>  |  0.97      131<L>  |  94<L>  |  93<H>  ----------------------------<  106<H>  6.9<HH>   |  13<L>  |  0.99    Ca    8.5      2020 16:40  Ca    8.8      2020 12:17  Phos  3.6       Mg     2.7         TPro  4.9<L>  /  Alb  2.6<L>  /  TBili  9.4<H>  /  DBili  x   /  AST  147<H>  /  ALT  130<H>  /  AlkPhos  1191<H>      Magnesium, Serum: 2.7 mg/dL (20 @ 16:40)    Phosphorus Level, Serum: 3.6 mg/dL (20 @ 16:40)  Phosphorus Level, Serum: 4.4 mg/dL (20 @ 13:27)      PT/INR - ( 2020 13:27 )   PT: 15.8 SEC;   INR: 1.37          PTT - ( 2020 13:27 )  PTT:31.3 SEC              Urinalysis Basic - ( 2020 15:00 )    Color: DARK YELLOW / Appearance: Lt TURBID / S.022 / pH: 5.0  Gluc: NEGATIVE / Ketone: NEGATIVE  / Bili: SMALL / Urobili: MODERATE   Blood: NEGATIVE / Protein: 30 / Nitrite: NEGATIVE   Leuk Esterase: NEGATIVE / RBC: 6-10 / WBC 3-5   Sq Epi: OCC / Non Sq Epi: x / Bacteria: NEGATIVE                              13.4   57.74 )-----------( 111      ( 2020 12:17 )             41.1     CAPILLARY BLOOD GLUCOSE      POCT Blood Glucose.: 92 mg/dL (2020 14:30)    EKG w/ sinus tachycardia 110; TWI in V1, hyperacute T wave in V3, biphasic T wave in V4/V5       The Labs were reviewed by me   The Radiology was reviewed by me    EKG tracing reviewed by me TPro  4.9<L>  /  Alb  2.6<L>  /  TBili  9.4<H>  /  DBili  x   /  AST  147<H>  /  ALT  130<H>  /  AlkPhos  1191<H>      Magnesium, Serum: 2.7 mg/dL (20 @ 16:40)    Phosphorus Level, Serum: 3.6 mg/dL (20 @ 16:40)  Phosphorus Level, Serum: 4.4 mg/dL (20 @ 13:27)      PT/INR - ( 2020 13:27 )   PT: 15.8 SEC;   INR: 1.37          PTT - ( 2020 13:27 )  PTT:31.3 SEC              Urinalysis Basic - ( 2020 15:00 )    Color: DARK YELLOW / Appearance: Lt TURBID / S.022 / pH: 5.0  Gluc: NEGATIVE / Ketone: NEGATIVE  / Bili: SMALL / Urobili: MODERATE   Blood: NEGATIVE / Protein: 30 / Nitrite: NEGATIVE   Leuk Esterase: NEGATIVE / RBC: 6-10 / WBC 3-5   Sq Epi: OCC / Non Sq Epi: x / Bacteria: NEGATIVE                              13.4   57.74 )-----------( 111      ( 2020 12:17 )             41.1     CAPILLARY BLOOD GLUCOSE      POCT Blood Glucose.: 92 mg/dL (2020 14:30)    EKG, , w/ sinus tachycardia 110bpm; hyperacute Tw in V3-V6, sinusoidal T wave in V4-V5, no acute ST changes - my reading     CT ABDOMEN AND PELVIS IC    CT ANGIO CHEST (W)AW IC    PROCEDURE DATE:  2020   FINDINGS:  CHEST:  LUNGS AND LARGE AIRWAYS: Patent central airways. No pulmonary nodules.  PLEURA: No pleural effusion.  VESSELS: No pulmonary embolus. Coronary arterial calcifications.  HEART: Heart size is normal. No pericardial effusion.  MEDIASTINUM AND IZABEL: Mediastinal, hilar, and bilateral axillary lymphadenopathy. Reference prevascular lymph node measures 2.5 x 1.7 cm (2:39). Infiltration of the anterior mediastinal fat.  CHEST WALL AND LOWER NECK: Enlarged, heterogeneous thyroid.  ABDOMEN AND PELVIS:  LIVER: Within normal limits.  BILE DUCTS: Normal caliber.  GALLBLADDER: Gallbladder wall edema.  SPLEEN: Splenomegaly with peripheral wedge-shaped hypodensities, likely infarcts.  PANCREAS: Within normal limits.  ADRENALS: Within normal limits.  KIDNEYS/URETERS: Within normal limits.  BLADDER: Within normal limits.  REPRODUCTIVE ORGANS: IUD in the uterus. No adnexal masses.  BOWEL: No bowel obstruction. Appendectomy.  PERITONEUM: Small pelvic ascites. Hyperdense surgical material in the right lower abdomen (2:195 - 2:201).  VESSELS: There is mass effect on the IVC from retroperitoneal lymphadenopathy. Low-density within the infrarenal abdominal aorta (2:170) that occupies greater than 50% of aortic diameter, which could represent focal thrombus.  RETROPERITONEUM/LYMPH NODES: Significant retroperitoneal lymphadenopathy. Reference aortocaval lymph node measures 2.6 x 2.1 cm (2:65). Reference portal lymph node measures 2.5 x 1.7 cm (2:159).  ABDOMINAL WALL: Within normal limits.  BONES: Within normal limits.  IMPRESSION:  Mediastinal, hilar, and retroperitoneal lymphadenopathy consistent with known diagnosis of lymphoma. Splenomegaly with infarcts. Prior imaging may be submitted for comparison to assess for change.  No pulmonary embolus.  Low-density within the infrarenal abdominal aorta that occupies greater than 50% of aortic diameter, which could represent focal thrombus.  Gallbladder wall edema and small ascites.

## 2020-07-27 NOTE — H&P ADULT - PROBLEM SELECTOR PLAN 2
c/f aggressive lymphoma given recent CTAP findings of medistinal, hilar, and upper abd LAD w/ constitutional sx x 1-2 weeks and tumor lysis on presentation. Follows with Dr. Leal at [facility] [place]  - WBC 54 on presentation, bl 20s   - CTAP in the ED revealing for mediastinal, hilar and RP LAD, w/ involvement of the IVC; incidental right post rib fx   - heme/onc recs appreciated: patient requires LN bx, PET, and bone marrow bx  - treatment of TLS as above

## 2020-07-27 NOTE — H&P ADULT - NSICDXPASTMEDICALHX_GEN_ALL_CORE_FT
PAST MEDICAL HISTORY:  History of multinodular goiter     Rheumatoid arthritis Previously on Enbrel (3 years ago)  Previously on Xeljanz (1 year ago, switched to Rinvoc June 2020)    Skin melanoma Right shoulder

## 2020-07-27 NOTE — ED PROVIDER NOTE - ATTENDING CONTRIBUTION TO CARE
I performed a face-to-face evaluation of the patient and performed a history and physical examination. I agree with the history and physical examination.    In the process of being worked up for lung and abd masses and SOB. P/w slowly-progressive SOB. No F. Has tachycardia and hypoxia. Concern for PNA, COVID, anemia 2/2 cancer, PE. Check CT PA, labs. Give O2, admit.

## 2020-07-27 NOTE — CHART NOTE - NSCHARTNOTEFT_GEN_A_CORE
Reference #: [id number]    Others' Prescriptions  Patient Name: Rosa Ramirez Date: 1970  Address: [address] [place], [place] [id number]: Female  Rx Written	Rx Dispensed	Drug	Quantity	Days Supply	Prescriber Name	Payment Method	Dispenser  07/21/2020	07/21/2020	hydrocodone-acetaminophen 5-300 mg tablet	28	7	America Echeverria	[facility]

## 2020-07-27 NOTE — H&P ADULT - NSHPPHYSICALEXAM_GEN_ALL_CORE
PHYSICAL EXAM:    Vital Signs Last 24 Hrs  T(C): 37 (27 Jul 2020 16:50), Max: 37.2 (27 Jul 2020 11:12)  T(F): 98.6 (27 Jul 2020 16:50), Max: 98.9 (27 Jul 2020 11:12)  HR: 108 (27 Jul 2020 16:50) (108 - 122)  BP: 122/65 (27 Jul 2020 16:50) (101/57 - 125/52)  BP(mean): --  RR: 20 (27 Jul 2020 16:50) (18 - 20)  SpO2: 97% (27 Jul 2020 16:50) (95% - 97%)    General: No acute distress.  HEENT: NCAT.  PERRL.  EOMI.  No scleral icterus or injection.  Moist MM.  No oropharyngeal exudates.    Neck: Supple.  Full ROM.  No JVD.  No thyromegaly. No lymphadenopathy.   Heart: RRR.  Normal S1 and S2.  No murmurs, rubs, or gallops.   Lungs: CTAB. No wheezes, crackles, or rhonchi.    Abdomen: BS+, soft, NT/ND.  No organomegaly.  Skin: Warm and dry.  No rashes.  Extremities: No edema, clubbing, or cyanosis.  2+ peripheral pulses b/l.  Musculoskeletal: No deformities.  No spinal or paraspinal tenderness.  Neuro: A&Ox3.  CN II-XII intact.  5/5 strength in UE and LE b/l.  Tactile sensation intact in UE and LE b/l.  Cerebellar function intact PHYSICAL EXAM:    Vital Signs Last 24 Hrs  T(C): 37 (27 Jul 2020 16:50), Max: 37.2 (27 Jul 2020 11:12)  T(F): 98.6 (27 Jul 2020 16:50), Max: 98.9 (27 Jul 2020 11:12)  HR: 108 (27 Jul 2020 16:50) (108 - 122)  BP: 122/65 (27 Jul 2020 16:50) (101/57 - 125/52)  BP(mean): --  RR: 20 (27 Jul 2020 16:50) (18 - 20)  SpO2: 97% (27 Jul 2020 16:50) (95% - 97%)    General: middle aged female in NAD respiring on NC   HEENT: scleral icterus bl, multifocal goiter w/ mobile nodules right thyroid   Heart: normal S1, S2, tachycardic to 120s on exam, holosystolic murmur, no JVD   Lungs: CTAB. No wheezes, crackles, or rhonchi.    Abdomen: soft, distended, normoactive, TTP and +guarding RUQ, no rebound, rigidity   Skin: Warm and dry.  No rashes. Ecchymotic BL LE    Extremities: trace pedal edema   Neuro: A&Ox3.  Slightly somnolent. PHYSICAL EXAM:    Vital Signs Last 24 Hrs  T(C): 37 (27 Jul 2020 16:50), Max: 37.2 (27 Jul 2020 11:12)  T(F): 98.6 (27 Jul 2020 16:50), Max: 98.9 (27 Jul 2020 11:12)  HR: 108 (27 Jul 2020 16:50) (108 - 122)  BP: 122/65 (27 Jul 2020 16:50) (101/57 - 125/52)  BP(mean): --  RR: 20 (27 Jul 2020 16:50) (18 - 20)  SpO2: 97% (27 Jul 2020 16:50) (95% - 97%)    General: middle aged female in NAD respiring on NC   HEENT: scleral icterus bl, multifocal goiter w/ mobile nodules right thyroid   Heart: normal S1, S2, tachycardic to 120s on exam, holosystolic murmur, no JVD   Lungs: CTAB. No wheezes, crackles, or rhonchi.    Abdomen: soft, distended, normoactive, firm, TTP and +guarding RUQ (?stool burden), no rebound, rigidity   Skin: Warm and dry.  No rashes. Ecchymotic BL LE    Extremities: trace pedal edema   Neuro: A&Ox3.  Slightly somnolent. PHYSICAL EXAM:    Vital Signs Last 24 Hrs  T(C): 37 (27 Jul 2020 16:50), Max: 37.2 (27 Jul 2020 11:12)  T(F): 98.6 (27 Jul 2020 16:50), Max: 98.9 (27 Jul 2020 11:12)  HR: 108 (27 Jul 2020 16:50) (108 - 122)  BP: 122/65 (27 Jul 2020 16:50) (101/57 - 125/52)  BP(mean): --  RR: 20 (27 Jul 2020 16:50) (18 - 20)  SpO2: 97% (27 Jul 2020 16:50) (95% - 97%)

## 2020-07-27 NOTE — H&P ADULT - PROBLEM SELECTOR PLAN 1
Uric acid, K, LDH elevated on presentation, likely in the setting of lymphoma  - s/p rasburicase x 1 and lokelma x 1 in ED    - continue to monitor TLS (K+, Uric acid, LDH, Ca, Phos) labs q8 hrs   - c/w rasburicase for uric acid >12  - c/w lokelma PRN Uric acid, K, LDH elevated on presentation, likely in the setting of lymphoma  - s/p rasburicase x 1 and lokelma x 1 in ED    - continue to monitor TLS (K+, Uric acid, LDH, Ca, Phos) labs q8 hrs   - c/w rasburicase for uric acid >12  - c/w lokelma PRN  - monitor on tele Uric acid, K, LDH elevated on presentation, likely in the setting of lymphoma. EKG w/ hyperacute T wave changes   - s/p rasburicase x 1, lokelma x 1, regular x 2, Ca gluconate x 2 in ED   - continue to monitor TLS (K+, Uric acid, LDH, Ca, Phos) labs q8 hrs   - c/w rasburicase for uric acid >12  - c/w lokelma PRN, monitor stool op   - monitor on tele Uric acid=16.1, K=6.9, FWN=[id number] all elevated on presentation, likely in the setting of lymphoma. EKG w/ hyperacute Tw in V3-V6, sinusoidal T wave in V4-V5  - s/p rasburicase x 1, lokelma x 1, regular x 2, Ca gluconate x 2 in ED   - continue to monitor TLS (K+, Uric acid, LDH, Ca, Phos) labs q8 hrs   - c/w rasburicase for uric acid >12  - c/w lokelma PRN, monitor stool op   - monitor on tele

## 2020-07-27 NOTE — H&P ADULT - NSHPSOCIALHISTORY_GEN_ALL_CORE
No drinking, monogamous x 2 years     20 pack years smoking hx (quit this month)    No rec drug use No drinking, monogamous x 2 years   20 pack years smoking hx (quit this month)  No rec drug use No EtOH use  Monogamous x 2 years   20 pack years smoking hx (quit this month)  No recreational drug use  Works as writer

## 2020-07-27 NOTE — H&P ADULT - PROBLEM SELECTOR PLAN 9
Patient w/ multifocal nodule, as per patient has had FNAs q year managed by her rheumatologist that were normal   -f/u TSH  -endo f/u as outpatient

## 2020-07-27 NOTE — H&P ADULT - PROBLEM SELECTOR PLAN 7
on presentation. Tele w/ sinus tachycardia 120s. EKG w/o COURT   -likely demand ischemia in the setting of tachycardia, less likely ACS   -CTA chest w/o PE  -TTE 6/2020 (via [employer]) EF 68%, nl LVSF, mild MR   -CM CE q8hrs until stable/downtrending  -check BNP Patient w/ new thrombocytopenia to 98 on presentation, bl is thrombocytosis to 500-600s  - fibrinogen low at 129 on presentation raising c/f DIC   - DIC labs (fibrinogen, d-dimer, PT/INR) q8 hrs   - Cryoprecipitate for fibrinogen <100

## 2020-07-27 NOTE — ED ADULT NURSE NOTE - OBJECTIVE STATEMENT
pt received spot 7. pt A+Ox4, c/o SOB with fever, weakness since 7/16. states she was diagnosed with lymphoma on 7/16, with large abdominal and chest cavity mass. sent to ED by oncologist for eval. pt hypoxic on room air pulse ox-91-95%. placed on 2l NC, tolerating well. pt appears jaundice. states she scheduled for biopsy. IVSL in place. NS infusing. VSS. will monitor.

## 2020-07-27 NOTE — ED PROVIDER NOTE - PHYSICAL EXAMINATION
Gen: Diaphoretic, alert, interactive, mildly anxious.  HEENT: Normocephalic and atraumatic. + scleral icterus. EOMI, no nasal discharge, mucous membranes moist.  CV: Tachycardic. Regular and rhythm, +S1/S2. No significant lower extremity edema. Radial and DP pulses present and symmetrical. Capillary refill less than 2 seconds.  Resp: CTAB, no rales, rhonchi, or wheezes.  GI: Abd tender to palpation in RUQ, LUQ, and epigastrium. Large palpable mass in epigastrium. + BS. No overlying skin color changes.  MSK: No open wounds, no bruising, no petechiae, no LE edema.  Neuro: A&Ox4, following commands, speaking in full sentences, moving extremities spontaneously  Psych: Appropriate mood, SI

## 2020-07-27 NOTE — H&P ADULT - PROBLEM SELECTOR PLAN 3
Patient found to have infrarenal aortic thrombus occupying 50% of the aortic diameter  - start hep gtt  - CTM CBC Patient found to have infrarenal aortic thrombus occupying 50% of the aortic diameter  - start hep gtt  - patient thrombocytopenic, will hold hep gtt if plts continue to downtrend   - CTM CBC Patient found to have infrarenal aortic thrombus occupying 50% of the aortic diameter  - start hep gtt (awaiting weight to be recorded)   - patient thrombocytopenic, will hold hep gtt if plts continue to downtrend   - CTM CBC Hyperacute T wave in V3, biphasic T wave V5/V6 likely 2/2 hyperkalemia   - s/p Ca gluconate x 2  - lokelma PRN for hyperkalemia   - CTM EKG Hyperacute T wave in V3, biphasic T wave V5/V6 due to hyperkalemia   - s/p Ca gluconate x 2  - lokelma PRN for hyperkalemia   - CTM EKG  - Tele monitoring

## 2020-07-27 NOTE — H&P ADULT - PROBLEM SELECTOR PLAN 6
Cr 0.97 on presentation, bl 0.7-0.8, likely in the setting of TLS   -s/p 2L IVF in ED   -UA negative   -strict Is and Os   -CTM serum Cr Cr 0.97 on presentation, bl 0.7-0.8, likely in the setting of TLS   -s/p 2L IVF in ED   -UA negative   -strict Is and Os   -CTM serum Cr  -avoid nephrotoxins Patient w/ recent new transaminitis dx on OSH labs. Hx positive for abd pain and scleral icterus x 2 weeks. DDx includes choledocholithiases vs. obstruction from tumor burden vs. possible PVT/Budd-Chiari syndrome in setting of lymphoma. Low c/f cholangitis as no fever.   -Tb 9.4, Alk phos 1191, ,  on presentation  -CTAP w/ GB wall thickening   -f/u RUQ US w/ doppler to r/o hepatic vein vs. PVT   -CTM LFTs   -consider MRCP / GI consult for further eval if RUQ US neg Patient w/ recent new transaminitis dx on OSH labs. Hx positive for abd pain and scleral icterus x 2 weeks. DDx includes cholecystitis vs. choledocholithiases vs. obstruction from tumor burden vs. possible PVT/Budd-Chiari syndrome in setting of lymphoma. Low c/f cholangitis as no fever.   -Tb 9.4, Alk phos 1191, ,  on presentation  -CTAP w/ GB wall thickening   -will start epimeric zosyn for possible acute cholecystitis   -Surgical consult for possible intervention   -f/u RUQ US w/ doppler to r/o hepatic vein vs. PVT   -CTM LFTs   -consider MRCP / GI consult for further eval if RUQ US neg Patient w/ recent new transaminitis dx on OSH labs. Hx positive for abd pain and scleral icterus x 2 weeks. DDx includes cholecystitis vs. choledocholithiases vs. obstruction from tumor burden vs. possible PVT/Budd-Chiari syndrome in setting of lymphoma. Low c/f cholangitis as no fever.   -Tb 9.4, Alk phos 1191, ,  on presentation  -CTAP w/ GB wall thickening, c/f cholecystitis   -will start meropenem for possible acute cholecystitis   -Surgical consult for intervention   -f/u RUQ US w/ doppler to r/o hepatic vein vs. PVT   -CTM LFTs   -consider MRCP / GI consult for further eval if RUQ US neg Patient w/ recent new transaminitis dx on OSH labs. Hx positive for abd pain and scleral icterus x 2 weeks. DDx includes cholecystitis vs. choledocholithiases vs. obstruction from tumor burden vs. possible PVT/Budd-Chiari syndrome in setting of lymphoma. Low c/f cholangitis as no fever.   -Tb 9.4, Alk phos 1191, ,  on presentation  -CTAP w/ GB wall thickening; will start meropenem for possible acute cholecystitis   -f/u RUQ US w/ doppler to r/o hepatic vein vs. PVT   -CTM LFTs   -consider MRCP / GI consult for further eval if RUQ US neg  -Surgical curbsided for GB edema, recommend f/u RUQ CT A/P/C Patient found to have infrarenal aortic thrombus occupying 50% of the aortic diameter  - start hep gtt    - patient thrombocytopenic, will hold hep gtt if plts continue to downtrend <50  - CTM CBC

## 2020-07-27 NOTE — CONSULT NOTE ADULT - SUBJECTIVE AND OBJECTIVE BOX
[facility] Vascular Surgery Consultation     Patient is a 50y old Female who presents with a chief complaint of Tumor lysis syndrome (2020 19:36)      HPI:  50F w/ pmh of RA (on Rinvoq since 2020, has been on multiple other immunosuppressive drugs in the past), melanoma in situ sent in by hematologist Dr. Leal at [facility] after evaluation for lymphoma. Recent CTAP done as outpatient revealed hilar, mediastinal, and upper abdominal LAD w/ associated sx of fatigue, fever, sweats, and SOB. Imaging and labs suggestive of aggressive lymphoma and tumor lysis syndrome.. CTAP w/ IV contrast revealing infrarenal abdominal aorta thrombus occupying 50% of the aortic diameter. Vascular consulted for evaluation.    PAST MEDICAL & SURGICAL HISTORY:  Rheumatoid arthritis      FAMILY HISTORY:      SOCIAL HISTORY:    MEDICATIONS  (STANDING):    MEDICATIONS  (PRN):    Allergies    penicillin (Unknown)    Intolerances        Vital Signs Last 24 Hrs  T(C): 37 (2020 16:50), Max: 37.2 (2020 11:12)  T(F): 98.6 (2020 16:50), Max: 98.9 (2020 11:12)  HR: 108 (2020 16:50) (108 - 122)  BP: 122/65 (2020 16:50) (101/57 - 125/52)  BP(mean): --  RR: 20 (2020 16:50) (18 - 20)  SpO2: 97% (2020 16:50) (95% - 97%)  Daily     Daily     General: NAD, well-nourished  HEENT: Atraumatic, EOMI  Resp: Breathing comfortably on RA  CV: Normal sinus rhythm  Abd: soft, ND, mild tenderness in RUQ, no RT/guarding  Ext: ROMIx4, motor strength intact x 4  Vasculat: Palpable DP and dopplerable PT bilaterally, motor and sensation of LE intact                          13.4   57.74 )-----------( 111      ( 2020 12:17 )             41.1     07-    132<L>  |  96<L>  |  92<H>  ----------------------------<  87  6.3<HH>   |  16<L>  |  0.97    Ca    8.5      2020 16:40  Phos  3.6       Mg     2.7         TPro  4.9<L>  /  Alb  2.6<L>  /  TBili  9.4<H>  /  DBili  x   /  AST  147<H>  /  ALT  130<H>  /  AlkPhos  1191<H>      PT/INR - ( 2020 13:27 )   PT: 15.8 SEC;   INR: 1.37          PTT - ( 2020 13:27 )  PTT:31.3 SEC  Urinalysis Basic - ( 2020 15:00 )    Color: DARK YELLOW / Appearance: Lt TURBID / S.022 / pH: 5.0  Gluc: NEGATIVE / Ketone: NEGATIVE  / Bili: SMALL / Urobili: MODERATE   Blood: NEGATIVE / Protein: 30 / Nitrite: NEGATIVE   Leuk Esterase: NEGATIVE / RBC: 6-10 / WBC 3-5   Sq Epi: OCC / Non Sq Epi: x / Bacteria: NEGATIVE        Radiographic Findings:     < from: CT Abdomen and Pelvis w/ IV Cont (20 @ 15:57) >  IMPRESSION:  Mediastinal, hilar, and retroperitoneal lymphadenopathy consistent with known diagnosis of lymphoma. Splenomegaly with infarcts. Prior imaging may be submitted for comparison to assess for change.    No pulmonary embolus.      Low-density within the infrarenal abdominal aorta that occupies greater than 50% of aortic diameter, which could represent focal thrombus.    Gallbladder wall edema and small ascites.              ANNABEL FERREIRA M.D., RADIOLOGY RESIDENT  This document has been electronically signed.  VENANCIO HURT M.D., ATTENDING RADIOLOGIST  This document has been electronically signed.2020  5:13PM        < end of copied text >

## 2020-07-27 NOTE — H&P ADULT - PROBLEM SELECTOR PLAN 5
Patient w/ recent new transaminitis dx on OSH labs. Hx positive for abd pain and scleral icterus x 2 weeks. DDx includes choledocholithiases vs. obstruction from tumor burden. Low c/f cholangitis as no fever.   -Tb 9.4, Alk phos 1191, ,  on presentation  -CTAP w/ GB wall thickening   -f/u RUQ US w/ doppler   -CTM LFTs   -consider MRCP / GI consult for further eval if RUQ US neg Patient w/ recent new transaminitis dx on OSH labs. Hx positive for abd pain and scleral icterus x 2 weeks. DDx includes choledocholithiases vs. obstruction from tumor burden. Low c/f cholangitis as no fever.   -Tb 9.4, Alk phos 1191, ,  on presentation  -CTAP w/ GB wall thickening   -f/u RUQ US w/ doppler to r/o hepatic vein vs. PVT   -CTM LFTs   -consider MRCP / GI consult for further eval if RUQ US neg Patient w/ recent new transaminitis dx on OSH labs. Hx positive for abd pain and scleral icterus x 2 weeks. DDx includes choledocholithiases vs. obstruction from tumor burden vs. possible PVT/Budd-Chiari syndrome in setting of lymphoma. Low c/f cholangitis as no fever.   -Tb 9.4, Alk phos 1191, ,  on presentation  -CTAP w/ GB wall thickening   -f/u RUQ US w/ doppler to r/o hepatic vein vs. PVT   -CTM LFTs   -consider MRCP / GI consult for further eval if RUQ US neg Patient w/ new thrombocytopenia to 98 on presentation, bl is thrombocytosis to 500-600s  - fibrinogen low at 129 on presentation raising c/f DIC   - DIC labs (fibrinogen, d-dimer, PT/INR) q8 hrs   - Cryoprecipitate for fibrinogen <100  on presentation. Tele w/ sinus tachycardia 120s. EKG w/o COURT   -likely demand ischemia in the setting of tachycardia, less likely ACS   -CTA chest w/o PE  -TTE 6/2020 (via [employer]) EF 68%, nl LVSF, mild MR   -CM CE q8hrs until stable/downtrending  -check BNP  on presentation. Tele w/ sinus tachycardia 120s. EKG w/o COURT   -likely demand ischemia in the setting of prolonged tachycardia, less likely ACS   -CTA chest w/o PE  -TTE 6/2020 (via [employer]) EF 68%, nl LVSF, mild MR   -CM CE q8hrs until stable/downtrending  -check BNP  -Tele  -Consider Cards c/s

## 2020-07-27 NOTE — H&P ADULT - NEUROLOGICAL
Ordered pathology/smear review and discussed with lab as well.  Will have a pathologist take a look at the blood and platelets to see if any abnormalities.  Please let patient know and see how she is doing.  Could also be very high due to all of her issues with the large hematoma and current wound care for large ulcer.    detailed exam

## 2020-07-27 NOTE — ED PROVIDER NOTE - CLINICAL SUMMARY MEDICAL DECISION MAKING FREE TEXT BOX
Bree: In the process of being worked up for lung and abd masses and SOB. P/w slowly-progressive SOB. No F. Has tachycardia and hypoxia. Concern for PNA, COVID, anemia 2/2 cancer, PE. Check CT PA, labs. Give O2, admit.

## 2020-07-27 NOTE — ED PROVIDER NOTE - NS ED ROS FT
Gen: + fever and chills  HEENT: Denies headache, vision changes, sore throat, and congestion.  CV: Denies chest pain, syncope, and orthopnea  Skin: Denies rash   Resp: + SOB. Denies significant cough  GI: + abd pain. Denies diarrhea, nausea, vomiting. Denies melena or hematochezia.  Msk: + back pain. Denies recent trauma and LE swelling  : Denies dysuria and hematuria.  Neuro: Denies LOC and new focal weakness  Psych: Denies SI/HI

## 2020-07-27 NOTE — H&P ADULT - HISTORY OF PRESENT ILLNESS
51 yo female with a PMHx of RA (on Rinvoq since 6/2020), melanoma in situ, goiter presenting from [facility] where she was evaluated for c/f lymphoma. Recent CTAP done as outpatient revealed  hilar, mediastinal, and upper abd LAD w/ associated sx of fatigue, fever, sweats, and SOB. At [facility], patient was seen by Dr. 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      ED vitals:  Pertinent labs:  ED course: 51 yo female with a PMHx of RA (on Rinvoq since 6/2020), melanoma in situ, goiter sent in by hematologist Dr. Leal at [facility] after evaluation for lymphoma. 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 Labs sent on 7/24 resulted today w/ c/f tumor lysis syndrome (uric acid 9.3, , K 5.4) and new thrombocytopenia.     ED vitals: Temp 98/9, -122, /259150/65, SpO2 95% RA   Pertinent labs: WBC 54 (bl 20-22), plt 111 (bl 500-600), Uric acid 16.1, LDH 1189, Phos/Ca wnl, Fibrinogen 1.37. CTAP w/ IV contrast revealing for hilar, mediastinal and RP LAD c/w lymphoma, infrarenal abdominal aorta thrombus occu[pying 50% of the aortic diameter   ED course: s/p 1L NS, 1L LR, rasburicase x 1, 1mg Ca gluconate x 2 51 yo female with a PMHx of RA (on prednisone and Rinvoq 6/2020), melanoma in situ s/p excision, multinodular goiter sent in by hematologist Dr. Leal at [email or web address] after evaluation for lymphoma. 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 Labs sent on 7/24 resulted today w/ c/f tumor lysis syndrome (uric acid 9.3, , K 5.4) and new thrombocytopenia.     Patient endorses daily fevers Tmax 102 since July 16, scleral icterus x 2 weeks, epigastric abd pain x 1 week, midline back pain x 2 weeks. Denies cp, heart palpitations, lower extremity edema, cough, n/v/d, sick contacts, recent air travel, known covid-19 contacts. Did have a 7hr car ride to [place] 2 weeks ago. No prior hx of DVT/PE. Rinvoq has been on hold since c/f lymphoma.     ED vitals: Temp 98/9, -122, /300483/65, SpO2 95% RA   Pertinent labs: WBC 54 (bl 20-22), plt 111 (bl 500-600), Uric acid 16.1, LDH 1189, Phos/Ca wnl, Fibrinogen 1.37. CTAP w/ IV contrast revealing for hilar, mediastinal and RP LAD c/w lymphoma, infrarenal abdominal aorta thrombus occupying 50% of the aortic diameter. Splenic infarcts. GB wall thickening.   ED course: s/p 1L NS, 1L LR, rasburicase x 1, 1mg Ca gluconate x 2 49 yo female with a PMHx of RA (on prednisone and Rinvoq 6/2020), melanoma in situ s/p excision, multinodular goiter sent in by hematologist Dr. Leal at [facility] after evaluation for lymphoma. 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 Labs sent on 7/24 resulted today w/ c/f tumor lysis syndrome (uric acid 9.3, , K 5.4) and new thrombocytopenia.     Patient endorses daily fevers Tmax 102 since July 16, scleral icterus x 2 weeks, epigastric abd pain x 1 week, midline back pain x 2 weeks. Denies cp, heart palpitations, lower extremity edema, cough, n/v/d, sick contacts, recent air travel, known covid-19 contacts. Did have a 7hr car ride to [place] 2 weeks ago. No prior hx of DVT/PE. Rinvoq has been on hold since c/f lymphoma.     ED vitals: Temp 98/9, -122, /337172/65, SpO2 95% RA   Pertinent labs: WBC 54 (bl 20-22), plt 111 (bl 500-600), Uric acid 16.1, LDH 1189, Phos/Ca wnl, Fibrinogen 1.37. CTAP w/ IV contrast revealing for hilar, mediastinal and RP LAD c/w lymphoma, infrarenal abdominal aorta thrombus occupying 50% of the aortic diameter. Splenic infarcts. GB wall thickening. EKG w/ peaked Twave in V3. and biphasic T wave v4/v5  ED course: s/p 1L NS, 1L LR, rasburicase x 1, 1mg Ca gluconate x 2, Regular x 2 51yo female MHx of RA (on prednisone and Rinvoq 6/2020), melanoma in situ s/p excision, multinodular goiter sent in by hematologist Dr. Leal at [facility] after evaluation for lymphoma. 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 Labs sent on 7/24 resulted today w/ c/f tumor lysis syndrome (uric acid 9.3, , K 5.4) and new thrombocytopenia.     Patient endorses daily fevers Tmax 102 since July 16, scleral icterus x 2 weeks, epigastric abd pain x 1 week, midline back pain x 2 weeks. Denies cp, heart palpitations, lower extremity edema, cough, n/v/d, sick contacts, recent air travel, known covid-19 contacts. Did have a 7hr car ride to [place] 2 weeks ago. No prior hx of DVT/PE. Rinvoq has been on hold since c/f lymphoma.     ED vitals: Temp 98/9, -122, /761669/65, SpO2 95% RA   Pertinent labs: WBC 54 (bl 20-22), plt 111 (bl 500-600), Uric acid 16.1, LDH 1189, Phos/Ca wnl, Fibrinogen 1.37. CTAP w/ IV contrast revealing for hilar, mediastinal and RP LAD c/w lymphoma, infrarenal abdominal aorta thrombus occupying 50% of the aortic diameter. Splenic infarcts. GB wall thickening. EKG w/ peaked Twave in V3. and biphasic T wave v4/v5  ED course: s/p 1L NS, 1L LR, rasburicase x 1, 1mg Ca gluconate x 2, Regular x 2 51yo female MHx of RA (on prednisone and Rinvoq 6/2020), melanoma in situ s/p excision, multinodular goiter sent in by hematologist Dr. Leal at [facility] after evaluation for lymphoma. 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 Labs sent on 7/24 resulted today w/ c/f tumor lysis syndrome (uric acid 9.3, , K 5.4) and new thrombocytopenia.   Patient endorses daily fevers Tmax 102 since July 16, scleral icterus x 2 weeks, intermittent epigastric and RUQ abd pain, 7/10, x 1 week, midline back pain x 2 weeks. Denies cp, heart palpitations, lower extremity edema, cough, n/v/d, sick contacts, recent air travel, known covid-19 contacts. Did have a 7hr car ride to [place] 2 weeks ago. No prior hx of DVT/PE. Rinvoq has been on hold since c/f lymphoma.     ED vitals: Temp 98/9, -122, /787603/65, SpO2 95% RA   Pertinent labs: WBC 54 (bl 20-22), plt 111 (bl 500-600), Uric acid 16.1, LDH 1189, Phos/Ca wnl, Fibrinogen 1.37. CTAP w/ IV contrast revealing for hilar, mediastinal and RP LAD c/w lymphoma, infrarenal abdominal aorta thrombus occupying 50% of the aortic diameter. Splenic infarcts. GB wall thickening. EKG w/ peaked Twave in V3. and biphasic T wave v4/v5  ED course: s/p 1L NS, 1L LR, rasburicase x 1, 1mg Ca gluconate x 2, Regular x 2

## 2020-07-28 NOTE — PROGRESS NOTE ADULT - SUBJECTIVE AND OBJECTIVE BOX
VASCULAR SURGERY PROGRESS NOTE    S: Patient c/o generalized weakness, generalized abdominal pain, denies lower extremity pain/weakness/loss of sensation/paresthesias.    O: Vital Signs  T(C): 36.6 (07-28 @ 01:21), Max: 37.2 (07-27 @ 11:12)  HR: 128 (07-28 @ 05:21) (108 - 130)  BP: 104/48 (07-28 @ 05:21) (101/57 - 138/54)  RR: 20 (07-28 @ 05:21) (18 - 20)  SpO2: 95% (07-28 @ 05:21) (94% - 97%)  07-27-20 @ 07:01  -  07-28-20 @ 07:00  --------------------------------------------------------  IN: 691 mL / OUT: 0 mL / NET: 691 mL      General: alert and oriented, NAD  Resp: airway patent, respirations unlabored  CVS: regular rate and rhythm  Abd: soft nondistended nontender  Extremities: no edema, motor and sensation of b/l LE intact  Vascular: Palpable DP b/l, PT signals b/l                          11.4   58.76 )-----------( 123      ( 28 Jul 2020 05:20 )             33.9   07-27    133<L>  |  98  |  82<H>  ----------------------------<  65<L>  5.6<H>   |  16<L>  |  0.88    Ca    8.9      27 Jul 2020 22:45  Phos  3.1     07-27  Mg     2.7     07-27    TPro  4.3<L>  /  Alb  2.3<L>  /  TBili  7.0<H>  /  DBili  x   /  AST  125<H>  /  ALT  104<H>  /  AlkPhos  967<H>  07-27 VASCULAR SURGERY PROGRESS NOTE    S: Patient c/o generalized weakness, generalized abdominal pain, denies lower extremity pain/weakness/loss of sensation/paresthesias.    O: Vital Signs  T(C): 36.6 (07-28 @ 01:21), Max: 37.2 (07-27 @ 11:12)  HR: 128 (07-28 @ 05:21) (108 - 130)  BP: 104/48 (07-28 @ 05:21) (101/57 - 138/54)  RR: 20 (07-28 @ 05:21) (18 - 20)  SpO2: 95% (07-28 @ 05:21) (94% - 97%)  07-27-20 @ 07:01  -  07-28-20 @ 07:00  --------------------------------------------------------  IN: 691 mL / OUT: 0 mL / NET: 691 mL      General: alert and oriented, NAD  Resp: airway patent, respirations unlabored  CVS: regular rate and rhythm  Abd: soft nondistended nontender  Extremities: no edema, motor and sensation of b/l LE intact  Vascular: Palpable DP b/l, PT signals b/l                          11.4   58.76 )-----------( 123      ( 28 Jul 2020 05:20 )             33.9   07-27    133<L>  |  98  |  82<H>  ----------------------------<  65<L>  5.6<H>   |  16<L>  |  0.88    Ca    8.9      27 Jul 2020 22:45  Phos  3.1     07-27  Mg     2.7     07-27    TPro  4.3<L>  /  Alb  2.3<L>  /  TBili  7.0<H>  /  DBili  x   /  AST  125<H>  /  ALT  104<H>  /  AlkPhos  967<H>  07-27     CTA abd/pelvis reviewed

## 2020-07-28 NOTE — CONSULT NOTE ADULT - PROBLEM SELECTOR RECOMMENDATION 2
Pt. with anion gap metabolic acidosis in the setting of TLS. Serum CO2 of 9 today. Recommend getting an ABG stat to assess pH and compensatory response. Can consider HCO3 tablets if pH < 7.20. Continue IV hdyration as above. Monitor for sings of volume overload.

## 2020-07-28 NOTE — PROCEDURE NOTE - NSINFORMCONSENT_GEN_A_CORE
Benefits, risks, and possible complications of procedure explained to patient/caregiver who verbalized understanding and gave verbal consent.
This was an emergent procedure.
Benefits, risks, and possible complications of procedure explained to patient/caregiver who verbalized understanding and gave verbal consent.

## 2020-07-28 NOTE — PROGRESS NOTE ADULT - PROBLEM SELECTOR PLAN 4
Patient w/ recent new transaminitis dx on OSH labs. Hx positive for abd pain and scleral icterus x 2 weeks. DDx includes cholecystitis vs. choledocholithiases vs. obstruction from tumor burden vs. possible PVT/Budd-Chiari syndrome in setting of lymphoma. Low c/f cholangitis as no fever.   -Tb 9.4, Alk phos 1191, ,  on presentation  -CTAP w/ GB wall thickening  - start empiric meropenem IV r/o acute cholecystitis   -f/u RUQ US w/ doppler to r/o hepatic vein vs. PVT   -CTM LFTs   -consider MRCP / GI consult for further eval if RUQ US neg  -Surgical curbsided for GB edema, recommend f/u RUQ  -On Heparin gtt for possible Budd-Chiari and aortic thrombus (as below)  - Patient w/ recent new transaminitis dx on OSH labs. Hx positive for abd pain and scleral icterus x 2 weeks. Low c/f cholangitis as no fever.   -Tb 9.4, Alk phos 1191, ,  on presentation  -CTAP w/ GB wall thickening  - initiated on empiric meropenem IV r/o acute cholecystitis - d/c'd after reviewing RUQ sono.  -RUQ US w/ doppler reviewed - no budd chiari  -monitor LFTs

## 2020-07-28 NOTE — CONSULT NOTE ADULT - PROBLEM SELECTOR RECOMMENDATION 9
Pt. with hyperkalemia in the setting of suspected spontaneous TLS. Pt. with admission serum potassium of 6.9 which improved to 5.6 with medical management. However serum potassium persistently elevated at 6.6 today. Pt. on lokelma TID. Recommend getting an ABG with electrolytes at the time of next BMP draw to rule out pseudo-hyperkalemia. Increase Lokelma to 10mg TID if pt. can tolerate PO. Insulin/D50 and Calcium gluconate as needed. Continue IV hydration and tailor to UOP of 100-150 cc/hr. Can consider loop diuretics if needed. Low potassium diet advised. If hyperkalemia persists despite optimal medical management , will need to consider RRT/CVVHD if it goes in line with the GOC of the pt. and families wishes. Pt. will need vascular access in case RRT is needed. Monitor serum potassium every 8 hours.
I Cheng Boateng MD performed a history and physical exam of the patient and discussed  the findings and plan with the house officer. I reviewed the resident note and agree with the findings and plan

## 2020-07-28 NOTE — PROGRESS NOTE ADULT - ATTENDING COMMENTS
50F RA, lymphoma (not confirmed) p/w tumor lysis syndrome c/b abdominal aorta thrombus w/ splenic infarct.  Continue TLS and DIC labs q8hrs.  Renal consult for electrolyte management in a setting of TLS.  Continue rasburicase, lokelma, aggressive IVF hydration. Continue tele monitor. Heparin gtt for aortic thrombus. D/C'd abx as clinical suspicion for acute avery is low. Surgical consult for LN excisional bx.

## 2020-07-28 NOTE — CONSULT NOTE ADULT - SUBJECTIVE AND OBJECTIVE BOX
HPI:  Ms. Allison, a 50-year-old woman with hx of RA was sent from [facility] due to concern for agressive lymphoma with tumor lysis. Pt has long history of RA and has taken immunosupressive medications- Embrel, Xeljanz for several years. Lastly she was on Rinvoc for the past few weeks. She is now being evaluated for mediastinal and upper abdominal LAD with constitutional symptoms. The possibility of lymphoma is high. Pt saw Dr. Leal (hematologist) as outpt and was concerned for aggressive lymphoma with tumor lysis syndrome  and was sent to the hospital for further management.  CT on admission demonstrated mediastinal, hilar, retroperitoneal, and axillary lymphadenopathy, and hepatosplenomegaly. Surgery is now consulted for excisional lymph node biopsy.  Patient currently c/o diffuse abdominal pain, generalized weakness, malaise and fatigue. Patient has notable history of melanoma on right posterior shoulder s/p excision 3 years ago in [place].    PMHx: History of multinodular goiter  Skin melanoma  Rheumatoid arthritis    PSHx: H/O melanoma excision    Medications (inpatient): ALBUTerol   0.5% 10 milliGRAM(s) Nebulizer four times a day  allopurinol 300 milliGRAM(s) Oral daily  heparin   Injectable 5000 Unit(s) IV Push once  heparin  Infusion.  Unit(s)/Hr IV Continuous <Continuous>  HYDROmorphone  Injectable 1 milliGRAM(s) IV Push once  lidocaine 2% Injectable 20 milliLiter(s) Local Injection once  polyethylene glycol 3350 17 Gram(s) Oral two times a day  rasburicase IVPB 3 milliGRAM(s) IV Intermittent once  senna 2 Tablet(s) Oral at bedtime  sodium chloride 0.9%. 1000 milliLiter(s) IV Continuous <Continuous>  sodium zirconium cyclosilicate 5 Gram(s) Oral three times a day    Medications (PRN):heparin   Injectable 4500 Unit(s) IV Push every 6 hours PRN  heparin   Injectable 2000 Unit(s) IV Push every 6 hours PRN  HYDROmorphone  Injectable 1 milliGRAM(s) IV Push every 4 hours PRN    Allergies: penicillin (Unknown)  (Intolerances: )  Social Hx: current smoker  Family Hx: Family history of bone cancer: Father unknown kind      Physical Exam  T(C): 36.9  HR: 130 (108 - 130)  BP: 110/50 (104/48 - 138/54)  RR: 20 (20 - 20)  SpO2: 95% (94% - 97%)  Tmax: T(C): , Max: 37.1 (20 @ 05:21)    20  -  20  --------------------------------------------------------  IN:    heparin  Infusion.: 92 mL    Oral Fluid: 120 mL    sodium chloride 0.9%.: 875 mL  Total IN: 1087 mL    OUT:    Voided: 110 mL  Total OUT: 110 mL    Total NET: 977 mL        General: uncomfortable, diaphoretic  Neuro: alert and oriented, no focal deficits, moves all extremities spontaneously  HEENT: NCAT, EOMI, anicteric, mucosa moist  Neck: no palpable lymphadenopathy  Respiratory: airway patent, respirations unlabored  CVS: regular rate and rhythm  Chest/axilla: no palpable axillary lymphadenopahy  Abdomen: soft, nontender, nondistended, no palpable masses, no palpable inguinal lymphadenopathy  Extremities: no edema, sensation and movement grossly intact  Skin: warm, dry, appropriate color    Labs:                        10.6   62.68 )-----------( 108      ( 2020 12:39 )             33.7     PT/INR - ( 2020 12:39 )   PT: 19.5 SEC;   INR: 1.67          PTT - ( 2020 12:39 )  PTT:69.4 SEC      137  |  98  |  97<H>  ----------------------------<  41<LL>  6.6<HH>   |  9<LL>  |  1.16    Ca    8.2<L>      2020 12:39  Phos  5.3       Mg     3.2         TPro  4.1<L>  /  Alb  2.2<L>  /  TBili  6.7<H>  /  DBili  x   /  AST  179<H>  /  ALT  94<H>  /  AlkPhos  891<H>      Urinalysis Basic - ( 2020 15:00 )    Color: DARK YELLOW / Appearance: Lt TURBID / S.022 / pH: 5.0  Gluc: NEGATIVE / Ketone: NEGATIVE  / Bili: SMALL / Urobili: MODERATE   Blood: NEGATIVE / Protein: 30 / Nitrite: NEGATIVE   Leuk Esterase: NEGATIVE / RBC: 6-10 / WBC 3-5   Sq Epi: OCC / Non Sq Epi: x / Bacteria: NEGATIVE            Imaging and other studies:

## 2020-07-28 NOTE — CONSULT NOTE ADULT - SUBJECTIVE AND OBJECTIVE BOX
[facility] DIVISION OF KIDNEY DISEASES AND HYPERTENSION -- [phone]  -- INITIAL CONSULT NOTE  --------------------------------------------------------------------------------  HPI: 50 year old female with rheumatoid arthritis on chronic immunosuppression, melanoma in situ s/p excision was admitted to [facility] on 7/27 , after being sent from her Oncologist's office with concerns over spontaneous TLS in the setting of suspected high grade lymphoma. Nephrology team consulted to help manage hyperkalemia and TLS. Pt. does not have underlying kidney disease. Pt. was recently seen as an outpatient and evaluated for suspected lymphoma , however there is no tissue diagnosis yet. Serum potassium on admission elevated at 6.9 with some EKG changes. Pt. subsequently managed medically with serum potassium improving to 5.6 on 7/27. Pt. also noted to have elevated uric acid levels and rising serum phosphorus levels. Serum potassium has increased to 6.6 this afternoon.     Pt. seen and evaluated at bedside this afternoon. Pt's daughter was present in the room at the time of evaluation. Pt. noted to be in visible distress/pain and unable to participate in most of the interview. Most of the history is per chart review and provider handoff. Pt. was able to mention that she feels pain in her back and also feels short of breath.     PAST HISTORY  --------------------------------------------------------------------------------  PAST MEDICAL & SURGICAL HISTORY:  History of multinodular goiter  Skin melanoma: Right shoulder  Rheumatoid arthritis: Previously on Enbrel (3 years ago)  Previously on Xeljanz (1 year ago, switched to Rinvoc June 2020)  H/O melanoma excision    FAMILY HISTORY:  Family history of bone cancer: Father unknown kind    PAST SOCIAL HISTORY:  Pt. has a 20 pack year smoking history    ALLERGIES & MEDICATIONS  --------------------------------------------------------------------------------  Allergies    penicillin (Unknown)    Intolerances    Standing Inpatient Medications  ALBUTerol   0.5% 10 milliGRAM(s) Nebulizer once  allopurinol 300 milliGRAM(s) Oral daily  heparin   Injectable 5000 Unit(s) IV Push once  heparin  Infusion.  Unit(s)/Hr IV Continuous <Continuous>  HYDROmorphone  Injectable 1 milliGRAM(s) IV Push once  lidocaine 2% Injectable 20 milliLiter(s) Local Injection once  meropenem  IVPB 1000 milliGRAM(s) IV Intermittent every 8 hours  polyethylene glycol 3350 17 Gram(s) Oral two times a day  rasburicase IVPB 3 milliGRAM(s) IV Intermittent once  senna 2 Tablet(s) Oral at bedtime  sodium chloride 0.9%. 1000 milliLiter(s) IV Continuous <Continuous>  sodium zirconium cyclosilicate 5 Gram(s) Oral three times a day    REVIEW OF SYSTEMS  --------------------------------------------------------------------------------  Limited ROS due to medical condition    VITALS/PHYSICAL EXAM  --------------------------------------------------------------------------------  T(C): 36.9 (07-28-20 @ 13:31), Max: 37.1 (07-28-20 @ 05:21)  HR: 130 (07-28-20 @ 13:31) (115 - 130)  BP: 110/50 (07-28-20 @ 13:31) (104/48 - 138/54)  RR: 20 (07-28-20 @ 13:31) (20 - 20)  SpO2: 95% (07-28-20 @ 13:31) (94% - 95%)  Wt(kg): --  Height (cm): 157.5 (07-28-20 @ 05:21)  Weight (kg): 56.7 (07-28-20 @ 05:21)  BMI (kg/m2): 22.9 (07-28-20 @ 05:21)  BSA (m2): 1.57 (07-28-20 @ 05:21)    07-27-20 @ 07:01  -  07-28-20 @ 07:00  --------------------------------------------------------  IN: 1087 mL / OUT: 110 mL / NET: 977 mL    Physical Exam:               General : Chronically ill appearing               HEENT : No JVD, anicteric               Chest : Fair air entry B/L               Cardiac : Tachycardiac, S1+ S2               Abdomen : Soft, non-tender               Neuro : Somnolent               Extremities : No edema    LABS/STUDIES  --------------------------------------------------------------------------------              10.6   62.68 >-----------<  108      [07-28-20 @ 12:39]              33.7     137  |  98  |  97  ----------------------------<  41      [07-28-20 @ 12:39]  6.6   |  9   |  1.16        Ca     8.2     [07-28-20 @ 12:39]      iCa    1.18     [07-27 @ 16:40]      Mg     3.2     [07-28-20 @ 12:39]      Phos  5.3     [07-28-20 @ 12:39]    Creatinine Trend:  SCr 1.16 [07-28 @ 12:39]  SCr 0.88 [07-27 @ 22:45]  SCr 0.97 [07-27 @ 16:40]  SCr 0.99 [07-27 @ 12:17]

## 2020-07-28 NOTE — PROGRESS NOTE ADULT - PROBLEM SELECTOR PLAN 7
Patient w/ new thrombocytopenia to 98 on presentation, bl is thrombocytosis to 500-600s  - fibrinogen low at 129 on presentation raising c/f DIC   - DIC labs (fibrinogen, d-dimer, PT/INR) q8 hrs   - Cryoprecipitate for fibrinogen <100

## 2020-07-28 NOTE — PROCEDURE NOTE - NSSITEPREP_SKIN_A_CORE
chlorhexidine
povidone iodine (if allergic to chlorhexidine)

## 2020-07-28 NOTE — PROGRESS NOTE ADULT - PROBLEM SELECTOR PLAN 6
CT A/P/C Patient found to have infrarenal aortic thrombus occupying 50% of the aortic diameter  - start hep gtt    - patient thrombocytopenic, will hold hep gtt if plts continue to downtrend <50  - CTM CBC CT A/P/C Patient found to have infrarenal aortic thrombus occupying 50% of the aortic diameter  - start hep gtt    - patient thrombocytopenic, will hold hep gtt if plts continue to downtrend <50  - monitor CBC

## 2020-07-28 NOTE — PROGRESS NOTE ADULT - ASSESSMENT
Ms. 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  -c/w monitoring TLS labs q12 s/p rasburicase 6 mg on 7/27 and uric acid 16.7>>>7.0>>>7.7   -c/w checking daily coags and DIC labs (d-dimer, PT, PTT, fibrinogen).   -correct electrolyses and supportive care. Cryoprecipitate for fibrinogen < 100  -c/w IVF and allopurinol     # r/o Lymphoma  -CT from outside showed  diffuse Mediastinal and upper abd LAD  -repeat CT c/a/p results included above   - pending smear review   - Will need for excisional/core biopsy of LN, preferrably axillary node by gen surg. followed by PET and bone marrow.   - management of TLS as stated above   - will need PICC line, send blood cultures   - anemia and thrombocytopenia likely 2/2 underlying lymphoma and will trend   - transaminitis and eleveated bili likely 2/2 biliary obstruction from lymphoma, continue to trend LFTs. pending direct bili for today     # Infrarenal aorta thrombus  - c/w heparin gtt. vascular recs appreciated     #elevated troponin   -CK normal. management per primary team   -check TTE     discussed with primary team     Ramon Baez  PGY 4  Heme-Onc fellow  [id number] Ms. 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  -c/w monitoring TLS labs q12 s/p rasburicase 6 mg on 7/27 and uric acid 16.7>>>7.0>>>7.7   -give rasburicase 3 mg for uric acid >9,   6 mg if uric acid >12   -c/w checking daily coags and DIC labs (d-dimer, PT, PTT, fibrinogen).   -correct electrolyses and supportive care. Cryoprecipitate for fibrinogen < 100  -c/w IVF and allopurinol     # r/o Lymphoma  -CT from outside showed  diffuse Mediastinal and upper abd LAD  -repeat CT c/a/p results included above   - pending smear review   - Will need LN excision, preferrably axillary node by gen surg. consult gen surgery. followed by PET and bone marrow.   - management of TLS as stated above   - will need PICC line, send blood cultures   - anemia and thrombocytopenia likely 2/2 underlying lymphoma and will trend   - transaminitis and eleveated bili likely 2/2 biliary obstruction from lymphoma, continue to trend LFTs. elevated bili primarily direct bili. US abn reviewed and no cholecystitis     # Infrarenal aorta thrombus  - c/w heparin gtt. vascular recs appreciated     #elevated troponin   -CK normal. management per primary team   -check TTE     discussed with primary team     Ramon Baez  PGY 4  Heme-Onc fellow  [id number] Ms. 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  -c/w monitoring TLS labs q8 s/p rasburicase 6 mg on 7/27 and uric acid 16.7>>>7.0>>>7.7   -check K with VBG as she may have pseudohyperkalemia from cell lysis   -give rasburicase 3 mg for uric acid >9,   6 mg if uric acid >12   -c/w checking daily coags and DIC labs (d-dimer, PT, PTT, fibrinogen).   -correct electrolytes and supportive care. Cryoprecipitate for fibrinogen < 100  -c/w IVF and allopurinol       #SIRS   -management and w/u per primary team, currently on meropenem, recommend obtaining blood cultures     # r/o Lymphoma  -CT from outside showed  diffuse Mediastinal and upper abd LAD  -repeat CT c/a/p results included above   - pending smear review   - Will need LN excision vs IR for core biopsy. preferrably axillary node by gen surg. consult gen surgery. followed by PET and bone marrow.   - management of TLS as stated above   - will need PICC line, send blood cultures   - anemia and thrombocytopenia likely 2/2 underlying lymphoma and will trend   - transaminitis and eleveated bili likely 2/2 biliary obstruction from lymphoma, continue to trend LFTs. elevated bili primarily direct bili. US abn reviewed and no cholecystitis     # Infrarenal aorta thrombus  - c/w heparin gtt. vascular recs appreciated     #elevated troponin   -CK normal. management per primary team   -check TTE, needs baseline prior to chemo     discussed with primary team     Ramon Baez  PGY 4  Heme-Onc fellow  [id number]

## 2020-07-28 NOTE — PROCEDURE NOTE - NSPROCDETAILS_GEN_ALL_CORE
difficult/crash intubation/connected to ventilator/patient pre-oxygenated, tube inserted, placement confirmed
sterile dressing applied/guidewire recovered/sterile technique, catheter placed/lumen(s) aspirated and flushed/ultrasound guidance
ultrasound utilization/secured in place/blood seen on insertion/flushes easily/location identified, draped/prepped, sterile technique used/dressing applied
sterile dressing applied/sterile technique, catheter placed/ultrasound guidance/guidewire recovered/lumen(s) aspirated and flushed
ultrasound guidance/sutured in place/positive blood return obtained via catheter/location identified, draped/prepped, sterile technique used, needle inserted/introduced/connected to a pressurized flush line/hemostasis with direct pressure, dressing applied/Seldinger technique/all materials/supplies accounted for at end of procedure

## 2020-07-28 NOTE — CONSULT NOTE ADULT - ATTENDING COMMENTS
FRANC  Metabolic Acidosis  Hyperkalemia    Planned for CRRT if possible
I Cheng Boateng MD performed a history and physical exam of the patient and discussed  the findings and plan with the house officer. I reviewed the resident note and agree with the findings and plan

## 2020-07-28 NOTE — PROGRESS NOTE ADULT - PROBLEM SELECTOR PLAN 1
I Cheng Boateng MD performed a history and physical exam of the patient and discussed  the findings and plan with the house officer. I reviewed the resident note and agree with the findings and plan I Cheng Boateng MD have seen and examined the patient today and agree with  the  evaluation, assessment and plan of the surgical house officer

## 2020-07-28 NOTE — PROCEDURE NOTE - PROCEDURE DATE TIME, MLM
28-Jul-2020 19:55
28-Jul-2020 20:00
28-Jul-2020 22:00
28-Jul-2020 22:00
28-Jul-2020 22:15
28-Jul-2020 16:30

## 2020-07-28 NOTE — PROGRESS NOTE ADULT - PROBLEM SELECTOR PLAN 1
Uric acid=16.1, K=6.9, LME=4884 all elevated on presentation, likely in the setting of lymphoma. EKG w/ hyperacute Tw in V3-V6, sinusoidal T wave in V4-V5  - s/p rasburicase x 1, lokelma x 1, regular x 2, Ca gluconate x 2 in ED   - continue to monitor TLS (K+, Uric acid, LDH, Ca, Phos) labs q8 hrs   - DIC labs q8 hrs  - c/w rasburicase for uric acid >12  - c/w lokelma PRN, monitor stool op   - monitor on tele Uric acid=16.1, K=6.9, VVR=3813 all elevated on presentation, likely in the setting of lymphoma. EKG w/ hyperacute Tw in V3-V6, sinusoidal T wave in V4-V5  - s/p rasburicase x 1, lokelma x 1, regular x 2, Ca gluconate x 2 in ED   - continue to monitor TLS (K+, Uric acid, LDH, Ca, Phos) labs q8 hrs   - DIC labs q8 hrs  - c/w rasburicase for uric acid >9  - c/w lokelma PRN, monitor stool op   - monitor on tele

## 2020-07-28 NOTE — PROCEDURE NOTE - SUPERVISORY STATEMENT
pt seen and examined at bedside, Pt tolerated the procedure well
pt tolerated the procedure welll. sterile dressing applied.

## 2020-07-28 NOTE — PROGRESS NOTE ADULT - PROBLEM SELECTOR PLAN 5
on presentation. Tele w/ sinus tachycardia 120s. EKG w/o COURT   -likely demand ischemia in the setting of prolonged tachycardia, less likely ACS   -CTA chest w/o PE  -TTE 6/2020 (via [employer]) EF 68%, nl LVSF, mild MR   -CM CE q8hrs until stable/downtrending  -check BNP  -Tele  -Consider Cards c/s  on presentation. Tele w/ sinus tachycardia 120s. EKG w/o COURT   -likely demand ischemia in the setting of prolonged tachycardia, less likely ACS   -CTA chest w/o PE  -TTE 6/2020 (via [employer]) EF 68%, nl LVSF, mild MR   -Continuing to uptrend.  -check BNP  -Tele  - TTE

## 2020-07-28 NOTE — CHART NOTE - NSCHARTNOTEFT_GEN_A_CORE
MICU Accept Note    CHIEF COMPLAINT: Tumor Lysis Syndrome    HPI / INTERVAL HISTORY:  50y female PMH of RA (on prednisone and Rinvoq 2020), melanoma in situ s/p excision, multinodular goiter sent in by hematologist Dr. Leal at [facility] after evaluation for lymphoma. Recent CTAP done as outpatient revealed hilar, mediastinal, and upper abd LAD w/ associated sx of fatigue, fever, sweats, and SOB w/ concern for aggressive lymphoma and tumor lysis syndrome. Labs sent on  resulted  w/ concern for tumor lysis syndrome (uric acid 9.3, , K 5.4) and new thrombocytopenia.   Patient endorses daily fevers Tmax 102 since , scleral icterus x 2 weeks, intermittent epigastric and RUQ abd pain, 7/10, x 1 week, midline back pain x 2 weeks. Denies cp, heart palpitations, lower extremity edema, cough, n/v/d, sick contacts, recent air travel, known covid-19 contacts. Did have a 7hr car ride to [place] 2 weeks ago. No prior hx of DVT/PE. Rinvoq has been on hold since concern for lymphoma.     In ED:  ED vitals: Temp 98/9, -122, /[id number]/65, SpO2 95% RA   Pertinent labs: WBC 54 (bl 20-22), plt 111 (bl 500-600), Uric acid 16.1, LDH 1189, Phos/Ca wnl, Fibrinogen 1.37. CTAP w/ IV contrast revealing for hilar, mediastinal and RP LAD c/w lymphoma, infrarenal abdominal aorta thrombus occupying 50% of the aortic diameter. Splenic infarcts. GB wall thickening. EKG w/ peaked Twave in V3. and biphasic T wave v4/v5  ED course: s/p 1L NS, 1L LR, rasburicase x 1, 1mg Ca gluconate x 2, Regular x 2    Admitted to medicine floor, heparin started for aortic thrombus, lokelma started for hyperkalemia. Today patient became obtunded, tachypneic, tachycardic, and febrile. Concern for need for dialysis.     PAST MEDICAL & SURGICAL HISTORY:  History of multinodular goiter  Skin melanoma: Right shoulder  Rheumatoid arthritis: Previously on Enbrel (3 years ago)  Previously on Xeljanz (1 year ago, switched to Rinvoc 2020)  H/O melanoma excision      FAMILY HISTORY:  Family history of bone cancer: Father unknown kind      SOCIAL HISTORY:  Smoking: recently quit      HOME MEDICATIONS:      Allergies    penicillin (Unknown)    Intolerances          REVIEW OF SYSTEMS:  Constitutional: No fevers, chills, weight loss, weight gain  HEENT: No vision problems, eye pain, nasal congestion, rhinorrhea, sore throat, dysphagia  CV: No chest pain, orthopnea, palpitations  Resp: No cough, dyspnea, wheezing, hemoptysis  GI: No nausea, vomiting, diarrhea, constipation, abdominal pain  : [ ] dysuria [ ] nocturia [ ] hematuria [ ] increased urinary frequency  Musculoskeletal: [ ] back pain [ ] myalgias [ ] arthralgias [ ] fracture  Skin: [ ] rash [ ] itch  Neurological: [ ] headache [ ] dizziness [ ] syncope [ ] weakness [ ] numbness  Psychiatric: [ ] anxiety [ ] depression  Endocrine: [ ] diabetes [ ] thyroid problem  Hematologic/Lymphatic: [ ] anemia [ ] bleeding problem  Allergic/Immunologic: [ ] itchy eyes [ ] nasal discharge [ ] hives [ ] angioedema  [ ] All other systems negative  [ ] Unable to assess ROS because ________    OBJECTIVE:  ICU Vital Signs Last 24 Hrs  T(C): 36.9 (2020 13:31), Max: 37.1 (2020 05:21)  T(F): 98.4 (2020 13:31), Max: 98.8 (2020 05:21)  HR: 130 (2020 13:31) (115 - 130)  BP: 110/50 (2020 13:31) (104/48 - 138/54)  BP(mean): --  ABP: --  ABP(mean): --  RR: 20 (2020 13:31) (20 - 20)  SpO2: 95% (2020 13:31) (94% - 95%)         @ 07:01  -   @ 07:00  --------------------------------------------------------  IN: 1087 mL / OUT: 110 mL / NET: 977 mL      CAPILLARY BLOOD GLUCOSE      POCT Blood Glucose.: 159 mg/dL (2020 14:33)      PHYSICAL EXAM:  General:   HEENT:   Neck:   Chest/Lungs:  Heart:  Abdomen:   Extremities:   Skin:   Neuro:   Psych:     LINES:     HOSPITAL MEDICATIONS:  MEDICATIONS  (STANDING):  acetaminophen  IVPB .. 1000 milliGRAM(s) IV Intermittent once  ALBUTerol   0.5% 10 milliGRAM(s) Nebulizer once  allopurinol 300 milliGRAM(s) Oral daily  heparin   Injectable 5000 Unit(s) IV Push once  heparin  Infusion.  Unit(s)/Hr (11 mL/Hr) IV Continuous <Continuous>  HYDROmorphone  Injectable 1 milliGRAM(s) IV Push once  HYDROmorphone  Injectable 0.5 milliGRAM(s) IV Push once  lidocaine 2% Injectable 20 milliLiter(s) Local Injection once  meropenem  IVPB 1000 milliGRAM(s) IV Intermittent every 8 hours  polyethylene glycol 3350 17 Gram(s) Oral two times a day  senna 2 Tablet(s) Oral at bedtime  sodium chloride 0.9% Bolus 1000 milliLiter(s) IV Bolus once  sodium chloride 0.9%. 1000 milliLiter(s) (200 mL/Hr) IV Continuous <Continuous>  sodium zirconium cyclosilicate 5 Gram(s) Oral three times a day    MEDICATIONS  (PRN):  heparin   Injectable 4500 Unit(s) IV Push every 6 hours PRN For aPTT less than 40  heparin   Injectable 2000 Unit(s) IV Push every 6 hours PRN For aPTT between 40 - 57  HYDROmorphone  Injectable 1 milliGRAM(s) IV Push every 4 hours PRN Severe Pain (7 - 10)      LABS:                        10.6   62.68 )-----------( 108      ( 2020 12:39 )             33.7     Hgb Trend: 10.6<--, 11.4<--, 13.4<--, 15.1<--      137  |  98  |  97<H>  ----------------------------<  41<LL>  6.6<HH>   |  9<LL>  |  1.16    Ca    8.2<L>      2020 12:39  Phos  5.3       Mg     3.2         TPro  4.1<L>  /  Alb  2.2<L>  /  TBili  6.7<H>  /  DBili  x   /  AST  179<H>  /  ALT  94<H>  /  AlkPhos  891<H>      Creatinine Trend: 1.16<--, 0.88<--, 0.97<--, 0.99<--  PT/INR - ( 2020 12:39 )   PT: 19.5 SEC;   INR: 1.67          PTT - ( 2020 12:39 )  PTT:69.4 SEC  Urinalysis Basic - ( 2020 15:00 )    Color: DARK YELLOW / Appearance: Lt TURBID / S.022 / pH: 5.0  Gluc: NEGATIVE / Ketone: NEGATIVE  / Bili: SMALL / Urobili: MODERATE   Blood: NEGATIVE / Protein: 30 / Nitrite: NEGATIVE   Leuk Esterase: NEGATIVE / RBC: 6-10 / WBC 3-5   Sq Epi: OCC / Non Sq Epi: x / Bacteria: NEGATIVE        Venous Blood Gas:   @ 12:17  7.36/29/57/18/85.8  VBG Lactate: --      MICROBIOLOGY:     RADIOLOGY & ADDITIONAL TESTS:      ASSESSMENT AND PLAN:  Mr./Mrs./Ms. is a ___    #Neuro    #Cardiovascular    #Respiratory    #GI/Nutrition    #/Renal    #Skin    #ID    #Endocrine    #Hematologic/DVT ppx    #Ethics MICU Accept Note    CHIEF COMPLAINT: Tumor Lysis Syndrome    HPI / INTERVAL HISTORY:  50y female PMH of RA (on prednisone and Rinvoq 2020), melanoma in situ s/p excision, multinodular goiter sent in by hematologist Dr. Leal at [facility] after evaluation for lymphoma. Recent CTAP done as outpatient revealed hilar, mediastinal, and upper abd LAD w/ associated sx of fatigue, fever, sweats, and SOB w/ concern for aggressive lymphoma and tumor lysis syndrome. Labs sent on  resulted  w/ concern for tumor lysis syndrome (uric acid 9.3, , K 5.4) and new thrombocytopenia.   Patient endorses daily fevers Tmax 102 since , scleral icterus x 2 weeks, intermittent epigastric and RUQ abd pain, 7/10, x 1 week, midline back pain x 2 weeks. Denies cp, heart palpitations, lower extremity edema, cough, n/v/d, sick contacts, recent air travel, known covid-19 contacts. Did have a 7hr car ride to [place] 2 weeks ago. No prior hx of DVT/PE. Rinvoq has been on hold since concern for lymphoma.     In ED:  ED vitals: Temp 98/9, -122, /686847/65, SpO2 95% RA   Pertinent labs: WBC 54 (bl 20-22), plt 111 (bl 500-600), Uric acid 16.1, LDH 1189, Phos/Ca wnl, Fibrinogen 1.37. CTAP w/ IV contrast revealing for hilar, mediastinal and RP LAD c/w lymphoma, infrarenal abdominal aorta thrombus occupying 50% of the aortic diameter. Splenic infarcts. GB wall thickening. EKG w/ peaked Twave in V3. and biphasic T wave v4/v5  ED course: s/p 1L NS, 1L LR, rasburicase x 1, 1mg Ca gluconate x 2, Regular x 2    Admitted to medicine floor, heparin started for aortic thrombus, lokelma started for hyperkalemia. Today patient became obtunded, tachypneic, tachycardic, and febrile. Concern for need for dialysis.     PAST MEDICAL & SURGICAL HISTORY:  History of multinodular goiter  Skin melanoma: Right shoulder  Rheumatoid arthritis: Previously on Enbrel (3 years ago)  Previously on Xeljanz (1 year ago, switched to Rinvoc 2020)  H/O melanoma excision      FAMILY HISTORY:  Family history of bone cancer: Father unknown kind      SOCIAL HISTORY:  Smoking: recently quit      HOME MEDICATIONS:      Allergies    penicillin (Unknown)    Intolerances          REVIEW OF SYSTEMS:  [ ] Unable to assess ROS because obtunded    OBJECTIVE:  ICU Vital Signs Last 24 Hrs  T(C): 36.9 (2020 13:31), Max: 37.1 (2020 05:21)  T(F): 98.4 (2020 13:31), Max: 98.8 (2020 05:21)  HR: 130 (2020 13:31) (115 - 130)  BP: 110/50 (2020 13:31) (104/48 - 138/54)  BP(mean): --  ABP: --  ABP(mean): --  RR: 20 (2020 13:31) (20 - 20)  SpO2: 95% (2020 13:31) (94% - 95%)        - @ 07:01  -   @ 07:00  --------------------------------------------------------  IN: 1087 mL / OUT: 110 mL / NET: 977 mL      CAPILLARY BLOOD GLUCOSE      POCT Blood Glucose.: 159 mg/dL (2020 14:33)      PHYSICAL EXAM:  Gen: AAOx0, obtunded  HEENT: oral mucosa moist, scleral icterus  Lung: CTAB, no respiratory distress, no wheezes/rhonchi/rales B/L  CV: tachycardic, no murmurs, rubs or gallops  Abd: soft, NTND, no guarding  MSK: no visible deformities  Neuro: Not responding to questions, moving all limbs spontaneously, no facial droop  Skin: Warm, well perfused, wound to left hip from bone marrow biopsy  Psych: unable to assess    LINES: peripheral IV x4     HOSPITAL MEDICATIONS:  MEDICATIONS  (STANDING):  acetaminophen  IVPB .. 1000 milliGRAM(s) IV Intermittent once  ALBUTerol   0.5% 10 milliGRAM(s) Nebulizer once  allopurinol 300 milliGRAM(s) Oral daily  heparin   Injectable 5000 Unit(s) IV Push once  heparin  Infusion.  Unit(s)/Hr (11 mL/Hr) IV Continuous <Continuous>  HYDROmorphone  Injectable 1 milliGRAM(s) IV Push once  HYDROmorphone  Injectable 0.5 milliGRAM(s) IV Push once  lidocaine 2% Injectable 20 milliLiter(s) Local Injection once  meropenem  IVPB 1000 milliGRAM(s) IV Intermittent every 8 hours  polyethylene glycol 3350 17 Gram(s) Oral two times a day  senna 2 Tablet(s) Oral at bedtime  sodium chloride 0.9% Bolus 1000 milliLiter(s) IV Bolus once  sodium chloride 0.9%. 1000 milliLiter(s) (200 mL/Hr) IV Continuous <Continuous>  sodium zirconium cyclosilicate 5 Gram(s) Oral three times a day    MEDICATIONS  (PRN):  heparin   Injectable 4500 Unit(s) IV Push every 6 hours PRN For aPTT less than 40  heparin   Injectable 2000 Unit(s) IV Push every 6 hours PRN For aPTT between 40 - 57  HYDROmorphone  Injectable 1 milliGRAM(s) IV Push every 4 hours PRN Severe Pain (7 - 10)      LABS:                        10.6   62.68 )-----------( 108      ( 2020 12:39 )             33.7     Hgb Trend: 10.6<--, 11.4<--, 13.4<--, 15.1<--      137  |  98  |  97<H>  ----------------------------<  41<LL>  6.6<HH>   |  9<LL>  |  1.16    Ca    8.2<L>      2020 12:39  Phos  5.3       Mg     3.2         TPro  4.1<L>  /  Alb  2.2<L>  /  TBili  6.7<H>  /  DBili  x   /  AST  179<H>  /  ALT  94<H>  /  AlkPhos  891<H>      Creatinine Trend: 1.16<--, 0.88<--, 0.97<--, 0.99<--  PT/INR - ( 2020 12:39 )   PT: 19.5 SEC;   INR: 1.67          PTT - ( 2020 12:39 )  PTT:69.4 SEC  Urinalysis Basic - ( 2020 15:00 )    Color: DARK YELLOW / Appearance: Lt TURBID / S.022 / pH: 5.0  Gluc: NEGATIVE / Ketone: NEGATIVE  / Bili: SMALL / Urobili: MODERATE   Blood: NEGATIVE / Protein: 30 / Nitrite: NEGATIVE   Leuk Esterase: NEGATIVE / RBC: 6-10 / WBC 3-5   Sq Epi: OCC / Non Sq Epi: x / Bacteria: NEGATIVE        Venous Blood Gas:   @ 12:17  7.36/29/57/18/85.8  VBG Lactate: --      MICROBIOLOGY:     RADIOLOGY & ADDITIONAL TESTS:      ASSESSMENT AND PLAN:  50y female with tumor lysis syndrome with AMS, tachycardia, tachypnea and fever with worsening metabolic derangement.     #Neuro:   - Intubated and sedated  - fentanyl and propofol drip    #Cardiovascular  - repeat EKG, monitor for changes related to hyperK  - on Neosynephrine, titrate as needed for hypotension    #Respiratory  - intubated and sedated  - ABG pending    #GI/Nutrition  - NPO    #/Renal  - Will need CVVHD, dialysis catheter to be placed 3 hours after heparin d/c'd  - HyperKalemia: s/p insulin, albuterol, lokelma  - continue rasburicase, allopurinol  - continue IV fluids  - monitor electrolytes    #ID  - febrile, Meropenem started  - f/u cultures    #Endocrine  - s/p insulin bolus for hyperk, monitor fingersticks    #Hematologic/DVT ppx  - heparin drip for infrarenal aortic thrombus  - f/u bone marrow biopsy  - f/u with IR for lymph node biopsy    #Ethics  - full code

## 2020-07-28 NOTE — PROGRESS NOTE ADULT - PROBLEM SELECTOR PLAN 3
Hyperacute T wave in V3, biphasic T wave V5/V6 due to hyperkalemia   - s/p Ca gluconate x 2  - lokelma PRN for hyperkalemia   - CTM EKG  - Tele monitoring Hyperacute T wave in V3, biphasic T wave V5/V6 due to hyperkalemia   - s/p Ca gluconate x 2  - continue lokelma PRN for hyperkalemia   - CTM EKG  - Tele monitoring

## 2020-07-28 NOTE — PROGRESS NOTE ADULT - PROBLEM SELECTOR PLAN 8
Hx of RA on prednisone 20, previously on Enbrel and Xeljanz. Switched to Rinvoc (Charles Stati) as of June 2020. Rinvoc on hold due to c/f lymphoma  -will hold prednisone for now  -f/u with Dr. America Echeverria patient's rheumatologist in AM

## 2020-07-28 NOTE — PROGRESS NOTE ADULT - PROBLEM SELECTOR PLAN 2
c/f aggressive lymphoma given recent CTAP findings of medistianal, hilar, and upper abd LAD w/ constitutional sx x 1-2 weeks and tumor lysis on presentation. Follows with Dr. Leal at [facility]  - WBC 54 on presentation, bl 20s   - CTAP in the ED revealing for mediastinal, hilar and RP LAD, w/ involvement of the IVC; incidental right post rib fx   - heme/onc recs appreciated: patient requires excisional LN bx, PET, and bone marrow bx  - treatment of TLS as above c/f aggressive lymphoma given recent CTAP findings of medistianal, hilar, and upper abd LAD w/ constitutional sx x 1-2 weeks and tumor lysis on presentation. [place]. Leal [place] [facility]  - WBC 54 on presentation, bl 20s   - CTAP in the ED revealing for mediastinal, hilar and RP LAD, w/ involvement of the IVC; incidental right post rib fx   - heme/onc recs appreciated: patient requires excisional LN bx, PET, and bone marrow bx - surgery consult  - treatment of TLS as above

## 2020-07-28 NOTE — CONSULT NOTE ADULT - PROBLEM SELECTOR RECOMMENDATION 3
Pt. with hemodynamically mediated FRANC in the setting of hyperuricemia/TLS. Scr increase of > 0.3 (0.8 >> 1.16 today) in 24 hours with evidence of worsening BUN today. I and Os not documented accurately. Pt. with no history of kidney dysfunction. Recommend IV hydration as outlined above. Strictly monitor UOP. HD/RRT might need to be considered if hyperkalemia/uremia continues to worsen. Avoid nephrotoxins. Dose medications as per eGFR.    If you have any questions, please feel free to contact me  Ravi Garcia  Nephrology Fellow  [phone]  (After 5pm or on weekends please page the on-call fellow)

## 2020-07-28 NOTE — PROGRESS NOTE ADULT - SUBJECTIVE AND OBJECTIVE BOX
Patient is a 50 year old woman who presented with laboratory features consistent with tumor lysis syndrome.    INTERVAL HPI/OVERNIGHT EVENTS:    No acute events overnight. Patient states she has diffuse abdominal and flank pain. She has not had nausea, diarrhea, vomiting, or muscle cramps. ROS positive for dizziness and inability to tolerate PO.      REVIEW OF SYSTEMS    Negative except as noted in HPI    FAMILY HISTORY:  Family history of bone cancer: Father unknown kind    T(C): 36.8 (07-28-20 @ 08:29), Max: 37.1 (07-28-20 @ 05:21)  HR: 115 (07-28-20 @ 08:29) (108 - 130)  BP: 126/58 (07-28-20 @ 08:29) (104/48 - 138/54)  RR: 20 (07-28-20 @ 08:29) (20 - 20)  SpO2: 95% (07-28-20 @ 08:29) (94% - 97%)  T(C): 36.8 (28 Jul 2020 08:29), Max: 37.1 (28 Jul 2020 05:21)  T(F): 98.2 (28 Jul 2020 08:29), Max: 98.8 (28 Jul 2020 05:21)  HR: 115 (28 Jul 2020 08:29) (108 - 130)  BP: 126/58 (28 Jul 2020 08:29) (104/48 - 138/54)  RR: 20 (28 Jul 2020 08:29) (20 - 20)  SpO2: 95% (28 Jul 2020 08:29) (94% - 97%)    PHYSICAL EXAM    GENERAL: laying on her right side in pain, in obvious distress and pain  EYES: EOMI, PERRLA  NECK: No JVD  NERVOUS SYSTEM:  Alert & Oriented X3, Good concentration  CHEST/LUNG: Clear to percussion bilaterally; No rales, rhonchi, wheezing, or rubs  HEART: Regular rate and rhythm; No murmurs, rubs, or gallops  ABDOMEN: Soft, Nondistended, abdomen diffusely tender to palpation  EXTREMITIES:  2+ Peripheral Pulses, No clubbing, cyanosis, or edema  SKIN: No rashes or lesions noted    Consultant(s) Notes Reviewed:  [ ] YES  [ ] NO  Care Discussed with Consultants/Other Providers [ ] YES  [ ] NO    LABS:                        10.6   62.68 )-----------( 108      ( 28 Jul 2020 12:39 )             33.7     07-27    133<L>  |  98  |  82<H>  ----------------------------<  65<L>  5.6<H>   |  16<L>  |  0.88    Ca    8.9      27 Jul 2020 22:45  Phos  3.1     07-27  Mg     2.7     07-27    TPro  x   /  Alb  x   /  TBili  x   /  DBili  6.3<H>  /  AST  x   /  ALT  x   /  AlkPhos  x   07-28    PT/INR - ( 28 Jul 2020 12:39 )   PT: 19.5 SEC;   INR: 1.67          PTT - ( 28 Jul 2020 12:39 )  PTT:69.4 SEC        RADIOLOGY & ADDITIONAL TESTS:    Imaging Personally Reviewed:  [x ] YES  [ ] NO      allopurinol 300 milliGRAM(s) Oral daily  heparin   Injectable 4500 Unit(s) IV Push every 6 hours PRN  heparin   Injectable 2000 Unit(s) IV Push every 6 hours PRN  heparin  Infusion.  Unit(s)/Hr IV Continuous <Continuous>  HYDROmorphone  Injectable 1 milliGRAM(s) IV Push every 4 hours PRN  polyethylene glycol 3350 17 Gram(s) Oral two times a day  senna 2 Tablet(s) Oral at bedtime  sodium chloride 0.9%. 1000 milliLiter(s) IV Continuous <Continuous>  sodium zirconium cyclosilicate 5 Gram(s) Oral three times a day Patient is a 50 year old woman who presented with laboratory features consistent with tumor lysis syndrome.    INTERVAL HPI/OVERNIGHT EVENTS:    No acute events overnight. Patient states she has diffuse abdominal and flank pain. "I just don't feel well." She has not had nausea, diarrhea, vomiting, or muscle cramps. ROS positive for dizziness and inability to tolerate PO.      REVIEW OF SYSTEMS    Negative except as noted in HPI    FAMILY HISTORY:  Family history of bone cancer: Father unknown kind    T(C): 36.8 (07-28-20 @ 08:29), Max: 37.1 (07-28-20 @ 05:21)  HR: 115 (07-28-20 @ 08:29) (108 - 130)  BP: 126/58 (07-28-20 @ 08:29) (104/48 - 138/54)  RR: 20 (07-28-20 @ 08:29) (20 - 20)  SpO2: 95% (07-28-20 @ 08:29) (94% - 97%)  T(C): 36.8 (28 Jul 2020 08:29), Max: 37.1 (28 Jul 2020 05:21)  T(F): 98.2 (28 Jul 2020 08:29), Max: 98.8 (28 Jul 2020 05:21)  HR: 115 (28 Jul 2020 08:29) (108 - 130)  BP: 126/58 (28 Jul 2020 08:29) (104/48 - 138/54)  RR: 20 (28 Jul 2020 08:29) (20 - 20)  SpO2: 95% (28 Jul 2020 08:29) (94% - 97%)    PHYSICAL EXAM    GENERAL: laying on her right side in pain, in obvious distress from pain  EYES: EOMI, PERRLA  NECK: No JVD  NERVOUS SYSTEM:  Alert & Oriented X3, Good concentration  CHEST/LUNG: Clear to percussion bilaterally; No rales, rhonchi, wheezing, or rubs  HEART: Regular rate and rhythm; No murmurs, rubs, or gallops  ABDOMEN: Soft, Nondistended, abdomen diffusely tender to palpation  EXTREMITIES:  2+ Peripheral Pulses, No clubbing, cyanosis, or edema  SKIN: No rashes or lesions noted    Consultant(s) Notes Reviewed:  [x ] YES  [ ] NO  Care Discussed with Consultants/Other Providers [ ] YES  [ ] NO    LABS:                        10.6   62.68 )-----------( 108      ( 28 Jul 2020 12:39 )             33.7     07-27    133<L>  |  98  |  82<H>  ----------------------------<  65<L>  5.6<H>   |  16<L>  |  0.88    Ca    8.9      27 Jul 2020 22:45  Phos  3.1     07-27  Mg     2.7     07-27    TPro  x   /  Alb  x   /  TBili  x   /  DBili  6.3<H>  /  AST  x   /  ALT  x   /  AlkPhos  x   07-28    PT/INR - ( 28 Jul 2020 12:39 )   PT: 19.5 SEC;   INR: 1.67          PTT - ( 28 Jul 2020 12:39 )  PTT:69.4 SEC        RADIOLOGY & ADDITIONAL TESTS:    Imaging Personally Reviewed:  [x ] YES  [ ] NO  < from: US Abdomen Doppler (07.28.20 @ 09:39) >  IMPRESSION:  Portal veins are patent, with normal direction of flow. No portal venous thrombosis, as clinically questioned.    Patent hepatic veins.    Nonspecific wall thickening of the gallbladder. Findings may be seen in the setting of acute hepatitis. Additional considerations include sequela of portal hypertension or right heart failure.    No evidence of cholelithiasis or acute cholecystitis.    Peripancreatic adenopathy.            MANDY BELTRE M.D., RADIOLOGY RESIDENT  This document has been electronically signed.  DRAKE WOODWARD M.D., ATTENDING RADIOLOGIST  This document has been electronically signed. Jul 28 2020 10:35AM    < end of copied text >      allopurinol 300 milliGRAM(s) Oral daily  heparin   Injectable 4500 Unit(s) IV Push every 6 hours PRN  heparin   Injectable 2000 Unit(s) IV Push every 6 hours PRN  heparin  Infusion.  Unit(s)/Hr IV Continuous <Continuous>  HYDROmorphone  Injectable 1 milliGRAM(s) IV Push every 4 hours PRN  polyethylene glycol 3350 17 Gram(s) Oral two times a day  senna 2 Tablet(s) Oral at bedtime  sodium chloride 0.9%. 1000 milliLiter(s) IV Continuous <Continuous>  sodium zirconium cyclosilicate 5 Gram(s) Oral three times a day

## 2020-07-28 NOTE — PROGRESS NOTE ADULT - ATTENDING COMMENTS
I Cheng Boateng MD performed a history and physical exam of the patient and discussed  the findings and plan with the house officer. I reviewed the resident note and agree with the findings and plan DILIA Boateng MD have seen and examined the patient today and agree with  the  evaluation, assessment and plan of the surgical house officer  DILIA Boateng MD have personally seen and examined the patient at bedside today at  730am

## 2020-07-28 NOTE — GOALS OF CARE CONVERSATION - ADVANCED CARE PLANNING - CONVERSATION DETAILS
Discussed current situation with the patient's mother ([phone]) whom the family agreed is the one to make decisions regarding care. She stated that she does not want to live her life as if she did not do enough for her daughter. Her daughter was very functional prior to this admission and has children she would want to live for. She wants her daughter to be full code at this time and likely would be willing to undergo most procedures if it was in the benefit of her health and survival.

## 2020-07-28 NOTE — PROCEDURE NOTE - NSPOSTCAREGUIDE_GEN_A_CORE
Verbal/written post procedure instructions were given to patient/caregiver/Instructed patient/caregiver regarding signs and symptoms of infection
Care for catheter as per unit/ICU protocols
Verbal/written post procedure instructions were given to patient/caregiver
Care for catheter as per unit/ICU protocols
Instructed patient/caregiver regarding signs and symptoms of infection

## 2020-07-28 NOTE — PROGRESS NOTE ADULT - PROBLEM SELECTOR PLAN 10
DVT: hep gtt   Diet: low K diet, NPO after MN for RUQ US   Code: full code DVT: hep gtt   Diet: low K diet  Code: full code

## 2020-07-28 NOTE — CONSULT NOTE ADULT - ASSESSMENT
Ms. Allison, a 50-year-old woman with hx of RA now with concern for aggressive lymphoma with tumor lysis. Radiographic evidence of mediastinal, hilar, retroperitoneal, and axillary lymphadenopathy, and hepatosplenomegaly. No palpable nodes on exam.    - Recommend IR consult for core needle biopsy of lymph nodes due to urgency   - Discussed with Heme/Onc and primary team  - If inadequate tissue from core needle biopsy, or IR unable to biopsy, will perform excisional biopsy    Discussed with attending, Dr. Gatito Jones    Please call with any questions: D Team Surgery [phone]

## 2020-07-28 NOTE — PROVIDER CONTACT NOTE (CRITICAL VALUE NOTIFICATION) - BACKGROUND
Pt admitted AOx4 with shortness of breath and back pain. Pt claims to have had fevers at home for several days.

## 2020-07-28 NOTE — PROGRESS NOTE ADULT - SUBJECTIVE AND OBJECTIVE BOX
INTERVAL HPI/OVERNIGHT EVENTS:  Patient S&E at bedside. No complaints at this time.        VITAL SIGNS:  T(F): 98.2 (20 @ 08:29)  HR: 115 (20 @ 08:29)  BP: 126/58 (20 @ 08:29)  RR: 20 (20 @ 08:29)  SpO2: 95% (20 @ 08:29)  Wt(kg): --    PHYSICAL EXAM:    Constitutional: NAD  Eyes: EOMI, sclera non-icteric  Neck: supple,  Respiratory: on 2L NC   Cardiovascular: tachycardic   Gastrointestinal: soft, NTND,  Extremities: no c/c/e  Neurological: AAOx3    MEDICATIONS  (STANDING):  allopurinol 300 milliGRAM(s) Oral daily  heparin  Infusion.  Unit(s)/Hr (11 mL/Hr) IV Continuous <Continuous>  meropenem  IVPB 1000 milliGRAM(s) IV Intermittent every 8 hours  polyethylene glycol 3350 17 Gram(s) Oral two times a day  senna 2 Tablet(s) Oral at bedtime  sodium chloride 0.9%. 1000 milliLiter(s) (125 mL/Hr) IV Continuous <Continuous>  sodium zirconium cyclosilicate 5 Gram(s) Oral three times a day    MEDICATIONS  (PRN):  heparin   Injectable 4500 Unit(s) IV Push every 6 hours PRN For aPTT less than 40  heparin   Injectable 2000 Unit(s) IV Push every 6 hours PRN For aPTT between 40 - 57  oxyCODONE    IR 5 milliGRAM(s) Oral every 4 hours PRN Severe Pain (7 - 10)      LABS:                        11.4   58.76 )-----------( 123      ( 2020 05:20 )             33.9     07-27    133<L>  |  98  |  82<H>  ----------------------------<  65<L>  5.6<H>   |  16<L>  |  0.88    Ca    8.9      2020 22:45  Phos  3.1     -  Mg     2.7     -    TPro  4.3<L>  /  Alb  2.3<L>  /  TBili  7.0<H>  /  DBili  x   /  AST  125<H>  /  ALT  104<H>  /  AlkPhos  967<H>      PT/INR - ( 2020 05:20 )   PT: 19.0 SEC;   INR: 1.64          PTT - ( 2020 22:45 )  PTT:29.4 SEC  Urinalysis Basic - ( 2020 15:00 )    Color: DARK YELLOW / Appearance: Lt TURBID / S.022 / pH: 5.0  Gluc: NEGATIVE / Ketone: NEGATIVE  / Bili: SMALL / Urobili: MODERATE   Blood: NEGATIVE / Protein: 30 / Nitrite: NEGATIVE   Leuk Esterase: NEGATIVE / RBC: 6-10 / WBC 3-5   Sq Epi: OCC / Non Sq Epi: x / Bacteria: NEGATIVE        RADIOLOGY & ADDITIONAL TESTS:      < from: CT Abdomen and Pelvis w/ IV Cont (20 @ 15:57) >    INTERPRETATION:  CLINICAL INFORMATION: Recent diagnoses of lymphoma and known chest/abdominal mass. Hypoxia, abdominal pain, and jaundice.    COMPARISON: None.    PROCEDURE:  CT Angiography of the Chest was performed followed by portal venous phase imaging of the Abdomen and Pelvis.  IV Contrast: 90 ml of Omnipaque 350 was injected intravenously. 10 ml were discarded.  Oral contrast: None.  Sagittal and coronal reformats were performed as well as 3D (MIP) reconstructions.    FINDINGS:  CHEST:  LUNGS AND LARGE AIRWAYS: Patent central airways. No pulmonary nodules.  PLEURA: No pleural effusion.  VESSELS: No pulmonary embolus. Coronary arterial calcifications.  HEART: Heart size is normal. No pericardial effusion.  MEDIASTINUM AND IZABEL: Mediastinal, hilar, and bilateral axillary lymphadenopathy. Reference prevascular lymph node measures 2.5 x 1.7 cm (2:39). Infiltration of the anterior mediastinal fat.  CHEST WALL AND LOWER NECK: Enlarged, heterogeneous thyroid.    ABDOMEN AND PELVIS:  LIVER: Within normal limits.  BILE DUCTS: Normal caliber.  GALLBLADDER: Gallbladder wall edema.  SPLEEN: Splenomegaly with peripheral wedge-shaped hypodensities, likely infarcts.  PANCREAS: Within normal limits.  ADRENALS: Within normal limits.  KIDNEYS/URETERS: Within normal limits.    BLADDER: Within normal limits.  REPRODUCTIVE ORGANS: IUD in the uterus. No adnexal masses.    BOWEL: No bowel obstruction. Appendectomy.  PERITONEUM: Small pelvic ascites. Hyperdense surgical material in the right lower abdomen (2:195 - 2:201).    VESSELS: There is mass effect on the IVC from retroperitoneal lymphadenopathy. Low-density within the infrarenal abdominal aorta (2:170) that occupies greater than 50% of aortic diameter, which could represent focal thrombus.    RETROPERITONEUM/LYMPH NODES: Significant retroperitoneal lymphadenopathy. Reference aortocaval lymph node measures 2.6 x 2.1 cm (2:65). Reference portal lymph node measures 2.5 x 1.7 cm (2:159).    ABDOMINAL WALL: Within normal limits.  BONES: Within normal limits.    IMPRESSION:  Mediastinal, hilar, and retroperitoneal lymphadenopathy consistent with known diagnosis of lymphoma. Splenomegaly with infarcts. Prior imaging may be submitted for comparison to assess for change.    No pulmonary embolus.      Low-density within the infrarenal abdominal aorta that occupies greater than 50% of aortic diameter, which could represent focal thrombus.    Gallbladder wall edema and small ascites.    < end of copied text > INTERVAL HPI/OVERNIGHT EVENTS:  Patient S&E at bedside. c/o abn pain. no fever, CP, SOB, nausea.       VITAL SIGNS:  T(F): 98.2 (20 @ 08:29)  HR: 115 (20 @ 08:29)  BP: 126/58 (20 @ 08:29)  RR: 20 (20 @ 08:29)  SpO2: 95% (20 @ 08:29)  Wt(kg): --    PHYSICAL EXAM:    Constitutional: NAD  Eyes: EOMI, sclera non-icteric  Neck: supple,  Respiratory: on 2L NC   Cardiovascular: tachycardic   Gastrointestinal: soft, NTND,  Extremities: no c/c/e  Neurological: AAOx3    MEDICATIONS  (STANDING):  allopurinol 300 milliGRAM(s) Oral daily  heparin  Infusion.  Unit(s)/Hr (11 mL/Hr) IV Continuous <Continuous>  meropenem  IVPB 1000 milliGRAM(s) IV Intermittent every 8 hours  polyethylene glycol 3350 17 Gram(s) Oral two times a day  senna 2 Tablet(s) Oral at bedtime  sodium chloride 0.9%. 1000 milliLiter(s) (125 mL/Hr) IV Continuous <Continuous>  sodium zirconium cyclosilicate 5 Gram(s) Oral three times a day    MEDICATIONS  (PRN):  heparin   Injectable 4500 Unit(s) IV Push every 6 hours PRN For aPTT less than 40  heparin   Injectable 2000 Unit(s) IV Push every 6 hours PRN For aPTT between 40 - 57  oxyCODONE    IR 5 milliGRAM(s) Oral every 4 hours PRN Severe Pain (7 - 10)      LABS:                        11.4   58.76 )-----------( 123      ( 2020 05:20 )             33.9     07-27    133<L>  |  98  |  82<H>  ----------------------------<  65<L>  5.6<H>   |  16<L>  |  0.88    Ca    8.9      2020 22:45  Phos  3.1     07-  Mg     2.7     07-    TPro  4.3<L>  /  Alb  2.3<L>  /  TBili  7.0<H>  /  DBili  x   /  AST  125<H>  /  ALT  104<H>  /  AlkPhos  967<H>      PT/INR - ( 2020 05:20 )   PT: 19.0 SEC;   INR: 1.64          PTT - ( 2020 22:45 )  PTT:29.4 SEC  Urinalysis Basic - ( 2020 15:00 )    Color: DARK YELLOW / Appearance: Lt TURBID / S.022 / pH: 5.0  Gluc: NEGATIVE / Ketone: NEGATIVE  / Bili: SMALL / Urobili: MODERATE   Blood: NEGATIVE / Protein: 30 / Nitrite: NEGATIVE   Leuk Esterase: NEGATIVE / RBC: 6-10 / WBC 3-5   Sq Epi: OCC / Non Sq Epi: x / Bacteria: NEGATIVE        RADIOLOGY & ADDITIONAL TESTS:      < from: CT Abdomen and Pelvis w/ IV Cont (20 @ 15:57) >    INTERPRETATION:  CLINICAL INFORMATION: Recent diagnoses of lymphoma and known chest/abdominal mass. Hypoxia, abdominal pain, and jaundice.    COMPARISON: None.    PROCEDURE:  CT Angiography of the Chest was performed followed by portal venous phase imaging of the Abdomen and Pelvis.  IV Contrast: 90 ml of Omnipaque 350 was injected intravenously. 10 ml were discarded.  Oral contrast: None.  Sagittal and coronal reformats were performed as well as 3D (MIP) reconstructions.    FINDINGS:  CHEST:  LUNGS AND LARGE AIRWAYS: Patent central airways. No pulmonary nodules.  PLEURA: No pleural effusion.  VESSELS: No pulmonary embolus. Coronary arterial calcifications.  HEART: Heart size is normal. No pericardial effusion.  MEDIASTINUM AND IZABEL: Mediastinal, hilar, and bilateral axillary lymphadenopathy. Reference prevascular lymph node measures 2.5 x 1.7 cm (2:39). Infiltration of the anterior mediastinal fat.  CHEST WALL AND LOWER NECK: Enlarged, heterogeneous thyroid.    ABDOMEN AND PELVIS:  LIVER: Within normal limits.  BILE DUCTS: Normal caliber.  GALLBLADDER: Gallbladder wall edema.  SPLEEN: Splenomegaly with peripheral wedge-shaped hypodensities, likely infarcts.  PANCREAS: Within normal limits.  ADRENALS: Within normal limits.  KIDNEYS/URETERS: Within normal limits.    BLADDER: Within normal limits.  REPRODUCTIVE ORGANS: IUD in the uterus. No adnexal masses.    BOWEL: No bowel obstruction. Appendectomy.  PERITONEUM: Small pelvic ascites. Hyperdense surgical material in the right lower abdomen (2:195 - 2:201).    VESSELS: There is mass effect on the IVC from retroperitoneal lymphadenopathy. Low-density within the infrarenal abdominal aorta (2:170) that occupies greater than 50% of aortic diameter, which could represent focal thrombus.    RETROPERITONEUM/LYMPH NODES: Significant retroperitoneal lymphadenopathy. Reference aortocaval lymph node measures 2.6 x 2.1 cm (2:65). Reference portal lymph node measures 2.5 x 1.7 cm (2:159).    ABDOMINAL WALL: Within normal limits.  BONES: Within normal limits.    IMPRESSION:  Mediastinal, hilar, and retroperitoneal lymphadenopathy consistent with known diagnosis of lymphoma. Splenomegaly with infarcts. Prior imaging may be submitted for comparison to assess for change.    No pulmonary embolus.      Low-density within the infrarenal abdominal aorta that occupies greater than 50% of aortic diameter, which could represent focal thrombus.    Gallbladder wall edema and small ascites.    < end of copied text >    < from: US Abdomen Doppler (20 @ 09:39) >    IMPRESSION:  Portal veins are patent, with normal direction of flow. No portal venous thrombosis, as clinically questioned.    Patent hepatic veins.    Nonspecific wall thickening of the gallbladder. Findings may be seen in the setting of acute hepatitis. Additional considerations include sequela of portal hypertension or right heart failure.    No evidence of cholelithiasis or acute cholecystitis.    Peripancreatic adenopathy.    < end of copied text > INTERVAL HPI/OVERNIGHT EVENTS:  Patient S&E at bedside. c/o abn pain. no CP, SOB, fever.       VITAL SIGNS:  T(F): 98.2 (20 @ 08:29)  HR: 115 (20 @ 08:29)  BP: 126/58 (20 @ 08:29)  RR: 20 (20 @ 08:29)  SpO2: 95% (20 @ 08:29)  Wt(kg): --    PHYSICAL EXAM:    Constitutional: NAD  Eyes: EOMI, sclera non-icteric  Neck: supple,  Respiratory: on 4L NC, tachypneic   Cardiovascular: tachycardic   Gastrointestinal: soft, NTND,  Extremities: no c/c/e  Neurological: AAOx3    MEDICATIONS  (STANDING):  allopurinol 300 milliGRAM(s) Oral daily  heparin  Infusion.  Unit(s)/Hr (11 mL/Hr) IV Continuous <Continuous>  meropenem  IVPB 1000 milliGRAM(s) IV Intermittent every 8 hours  polyethylene glycol 3350 17 Gram(s) Oral two times a day  senna 2 Tablet(s) Oral at bedtime  sodium chloride 0.9%. 1000 milliLiter(s) (125 mL/Hr) IV Continuous <Continuous>  sodium zirconium cyclosilicate 5 Gram(s) Oral three times a day    MEDICATIONS  (PRN):  heparin   Injectable 4500 Unit(s) IV Push every 6 hours PRN For aPTT less than 40  heparin   Injectable 2000 Unit(s) IV Push every 6 hours PRN For aPTT between 40 - 57  oxyCODONE    IR 5 milliGRAM(s) Oral every 4 hours PRN Severe Pain (7 - 10)      LABS:                        11.4   58.76 )-----------( 123      ( 2020 05:20 )             33.9     07-27    133<L>  |  98  |  82<H>  ----------------------------<  65<L>  5.6<H>   |  16<L>  |  0.88    Ca    8.9      2020 22:45  Phos  3.1     07-  Mg     2.7     -    TPro  4.3<L>  /  Alb  2.3<L>  /  TBili  7.0<H>  /  DBili  x   /  AST  125<H>  /  ALT  104<H>  /  AlkPhos  967<H>      PT/INR - ( 2020 05:20 )   PT: 19.0 SEC;   INR: 1.64          PTT - ( 2020 22:45 )  PTT:29.4 SEC  Urinalysis Basic - ( 2020 15:00 )    Color: DARK YELLOW / Appearance: Lt TURBID / S.022 / pH: 5.0  Gluc: NEGATIVE / Ketone: NEGATIVE  / Bili: SMALL / Urobili: MODERATE   Blood: NEGATIVE / Protein: 30 / Nitrite: NEGATIVE   Leuk Esterase: NEGATIVE / RBC: 6-10 / WBC 3-5   Sq Epi: OCC / Non Sq Epi: x / Bacteria: NEGATIVE        RADIOLOGY & ADDITIONAL TESTS:      < from: CT Abdomen and Pelvis w/ IV Cont (20 @ 15:57) >    INTERPRETATION:  CLINICAL INFORMATION: Recent diagnoses of lymphoma and known chest/abdominal mass. Hypoxia, abdominal pain, and jaundice.    COMPARISON: None.    PROCEDURE:  CT Angiography of the Chest was performed followed by portal venous phase imaging of the Abdomen and Pelvis.  IV Contrast: 90 ml of Omnipaque 350 was injected intravenously. 10 ml were discarded.  Oral contrast: None.  Sagittal and coronal reformats were performed as well as 3D (MIP) reconstructions.    FINDINGS:  CHEST:  LUNGS AND LARGE AIRWAYS: Patent central airways. No pulmonary nodules.  PLEURA: No pleural effusion.  VESSELS: No pulmonary embolus. Coronary arterial calcifications.  HEART: Heart size is normal. No pericardial effusion.  MEDIASTINUM AND IZABEL: Mediastinal, hilar, and bilateral axillary lymphadenopathy. Reference prevascular lymph node measures 2.5 x 1.7 cm (2:39). Infiltration of the anterior mediastinal fat.  CHEST WALL AND LOWER NECK: Enlarged, heterogeneous thyroid.    ABDOMEN AND PELVIS:  LIVER: Within normal limits.  BILE DUCTS: Normal caliber.  GALLBLADDER: Gallbladder wall edema.  SPLEEN: Splenomegaly with peripheral wedge-shaped hypodensities, likely infarcts.  PANCREAS: Within normal limits.  ADRENALS: Within normal limits.  KIDNEYS/URETERS: Within normal limits.    BLADDER: Within normal limits.  REPRODUCTIVE ORGANS: IUD in the uterus. No adnexal masses.    BOWEL: No bowel obstruction. Appendectomy.  PERITONEUM: Small pelvic ascites. Hyperdense surgical material in the right lower abdomen (2:195 - 2:201).    VESSELS: There is mass effect on the IVC from retroperitoneal lymphadenopathy. Low-density within the infrarenal abdominal aorta (2:170) that occupies greater than 50% of aortic diameter, which could represent focal thrombus.    RETROPERITONEUM/LYMPH NODES: Significant retroperitoneal lymphadenopathy. Reference aortocaval lymph node measures 2.6 x 2.1 cm (2:65). Reference portal lymph node measures 2.5 x 1.7 cm (2:159).    ABDOMINAL WALL: Within normal limits.  BONES: Within normal limits.    IMPRESSION:  Mediastinal, hilar, and retroperitoneal lymphadenopathy consistent with known diagnosis of lymphoma. Splenomegaly with infarcts. Prior imaging may be submitted for comparison to assess for change.    No pulmonary embolus.      Low-density within the infrarenal abdominal aorta that occupies greater than 50% of aortic diameter, which could represent focal thrombus.    Gallbladder wall edema and small ascites.    < end of copied text >    < from: US Abdomen Doppler (20 @ 09:39) >    IMPRESSION:  Portal veins are patent, with normal direction of flow. No portal venous thrombosis, as clinically questioned.    Patent hepatic veins.    Nonspecific wall thickening of the gallbladder. Findings may be seen in the setting of acute hepatitis. Additional considerations include sequela of portal hypertension or right heart failure.    No evidence of cholelithiasis or acute cholecystitis.    Peripancreatic adenopathy.    < end of copied text >

## 2020-07-28 NOTE — PROGRESS NOTE ADULT - ASSESSMENT
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

## 2020-07-28 NOTE — PROCEDURE NOTE - NSTRACHPOSTINTU_RESP_A_CORE
Appropriate capnography/Positive end tidal Co2 noted/Breath sounds equal/Chest X-Ray/Breath sounds bilateral

## 2020-07-29 NOTE — CHART NOTE - NSCHARTNOTEFT_GEN_A_CORE
DEATH NOTE    Called to bedside to evaluate the patient for asystole.     On physical exam, patient did not respond to verbal or noxious stimuli.  No spontaneous respirations.  Absent heart and breath sounds.  Absent radial and carotid pulses.   Pupils are fixed and dilated, no corneal reflex.  EKG rhythm strip shows asystole.   Patient pronounced dead at 3:31 am.  Attending notified.  Family declined autopsy.

## 2020-07-29 NOTE — DISCHARGE NOTE FOR THE EXPIRED PATIENT - HOSPITAL COURSE
Ms. Allison is a 49 y/o woman with PMH RA, melanoma in situ s/p excision, sent in by hematologist Dr. Leal at [facility] after evaluation for lymphoma. Recent CTAP done as outpatient revealed hilar, mediastinal, and upper abd LAD w/ associated sx of fatigue, fever, sweats, and SOB w/ concern for aggressive lymphoma and tumor lysis syndrome. Labs sent on 7/24 resulted 7/27 w/ concern for tumor lysis syndrome (uric acid 9.3, , K 5.4) and new thrombocytopenia.   Patient endorses daily fevers Tmax 102 since July 16, scleral icterus x 2 weeks, intermittent epigastric and RUQ abd pain, 7/10, x 1 week, midline back pain x 2 weeks. Denies cp, heart palpitations, lower extremity edema, cough, n/v/d, sick contacts, recent air travel, known covid-19 contacts. Did have a 7hr car ride to [place] 2 weeks ago. No prior hx of DVT/PE. Rinvoq has been on hold since concern for lymphoma. Ms. Allison is a 51 y/o woman with PMH RA, melanoma in situ s/p excision, sent in by hematologist Dr. Leal at [facility] after evaluation for lymphoma. Recent CTAP outpatient revealed hilar, mediastinal, and upper abd LAD w/ associated sx of fatigue, fever, sweats, and SOB w/ concern for aggressive lymphoma and tumor lysis syndrome. Labs sent on 7/24 resulted 7/27 w/ concern for tumor lysis syndrome (uric acid 9.3, , K 5.4) and new thrombocytopenia. In the ED, WBC was elevated to 54, uric acid 16.1, LDH 1189. CTAP with IV contrast showed hilar, mediastinal and RP LAD c/w lymphoma, infrarenal abdominal aorta thrombus occupying 50% of the aortic diameter, splenic infarcts, GB wall thickening. She received rasburicase, calcium gluconate, fluids in the ED, and was admitted to the medicine floor. She was started on heparin for aortic thrombus, lokelma for hyperkalemia. On the floor, patient became obtunded, tachypneic, tachycardic, febrile.     She was intubated and sedated. In the MICU, her blood pressures decreased to 80s/50s, tachycardic to 130s - 220. Femoral central venous catheter, arterial line (femoral), and shiley dialysis catheter in internal jugular (R). Bicarb, pressors,  was administered. FS glc was found to be <25, D50 amps were administered. CVVH was not administered due to hypotension. Calcium gluconate was administered for hyperkalemia. WBC was >66, potassium 7.7, LDH 2006, lactate 24. She continued to be hypotensive, tachycardic. Family was called to bedside. DNR confirmed. Rhythm showed asystole.     DEATH NOTE    Called to bedside to evaluate the patient for asystole.     On physical exam, patient did not respond to verbal or noxious stimuli.  No spontaneous respirations.  Absent heart and breath sounds.  Absent radial and carotid pulses.   Pupils are fixed and dilated, no corneal reflex.  EKG rhythm strip shows asystole.   Patient pronounced dead at 3:31am.  Attending notified.  Family declined autopsy. Ms. Allison is a 51 y/o woman with PMH RA, melanoma in situ s/p excision, sent in by hematologist Dr. Leal at [facility] after evaluation for lymphoma. Recent CTAP outpatient revealed hilar, mediastinal, and upper abd LAD w/ associated sx of fatigue, fever, sweats, and SOB w/ concern for aggressive lymphoma and tumor lysis syndrome. Labs sent on 7/24 resulted 7/27 w/ concern for tumor lysis syndrome (uric acid 9.3, , K 5.4) and new thrombocytopenia. In the ED, WBC was elevated to 54, uric acid 16.1, LDH 1189. CTAP with IV contrast showed hilar, mediastinal and RP LAD c/w lymphoma, infrarenal abdominal aorta thrombus occupying 50% of the aortic diameter, splenic infarcts, GB wall thickening. She received rasburicase, calcium gluconate, fluids in the ED, and was admitted to the medicine floor. She was started on heparin for aortic thrombus, lokelma for hyperkalemia. On the floor, patient became obtunded, tachypneic, tachycardic, febrile.     She was intubated and sedated. In the MICU, her blood pressures decreased to 80s/50s, tachycardic to 130s - 220. Femoral central venous catheter, arterial line (femoral), and shiley dialysis catheter in internal jugular (R). Bicarb, pressors were administered. FS glc was found to be <5, D50 amps were administered, started D10 drip. CVVH was not administered due to hypotension, unable to tolerate. Calcium gluconate was administered for hyperkalemia.  Potassium 7.7, LDH 2006, lactate 24. She continued to be hypotensive, tachycardic, rhythm in afib/vtach. Family was called to bedside. DNR confirmed. Rhythm showed asystole.     DEATH NOTE    Called to bedside to evaluate the patient for asystole.     On physical exam, patient did not respond to verbal or noxious stimuli.  No spontaneous respirations.  Absent heart and breath sounds.  Absent radial and carotid pulses.   Pupils are fixed and dilated, no corneal reflex.  EKG rhythm strip shows asystole.   Patient pronounced dead at 3:31am.  Attending notified.  Family declined autopsy. Ms. Allison is a 49 y/o woman with PMH RA, melanoma in situ s/p excision, sent in by hematologist Dr. Leal at [email or web address] after evaluation for lymphoma. Recent CTAP outpatient revealed hilar, mediastinal, and upper abd LAD w/ associated sx of fatigue, fever, sweats, and SOB w/ concern for aggressive lymphoma and tumor lysis syndrome. Labs sent on 7/24 resulted 7/27 w/ concern for tumor lysis syndrome (uric acid 9.3, , K 5.4) and new thrombocytopenia. In the ED, WBC was elevated to 54, uric acid 16.1, LDH 1189. CTAP with IV contrast showed hilar, mediastinal and RP LAD c/w lymphoma, infrarenal abdominal aorta thrombus occupying 50% of the aortic diameter, splenic infarcts, GB wall thickening. She received rasburicase, calcium gluconate, fluids in the ED, and was admitted to the medicine floor. She was started on heparin for aortic thrombus, lokelma for hyperkalemia. On the floor, patient became obtunded, tachypneic, tachycardic, febrile.     She was intubated and sedated. In the MICU, her blood pressures decreased to 80s/50s, tachycardic to 130s - 220. Femoral central venous catheter, arterial line (femoral), and shiley dialysis catheter in internal jugular (R). Bicarb, pressors were administered. FS glc was found to be <5, D50 amps were administered, started D10 drip. CVVH was not administered due to hypotension, unable to tolerate. Calcium gluconate was administered for hyperkalemia.  Potassium 7.7, LDH 2006, lactate 24. She continued to be hypotensive likely secondary to cardiogenic versus hypovolemic shock, tachycardic, rhythm in afib/vtach. Family was called to bedside. DNR confirmed. Rhythm showed asystole.     DEATH NOTE    Called to bedside to evaluate the patient for asystole.     On physical exam, patient did not respond to verbal or noxious stimuli.  No spontaneous respirations.  Absent heart and breath sounds.  Absent radial and carotid pulses.   Pupils are fixed and dilated, no corneal reflex.  EKG rhythm strip shows asystole.   Patient pronounced dead at 3:31am.  Attending notified.  Family declined autopsy.

## 2020-08-01 LAB
CULTURE RESULTS: SIGNIFICANT CHANGE UP
SPECIMEN SOURCE: SIGNIFICANT CHANGE UP

## 2020-08-03 LAB
CULTURE RESULTS: SIGNIFICANT CHANGE UP
CULTURE RESULTS: SIGNIFICANT CHANGE UP
SPECIMEN SOURCE: SIGNIFICANT CHANGE UP
SPECIMEN SOURCE: SIGNIFICANT CHANGE UP
TM INTERPRETATION: SIGNIFICANT CHANGE UP

## 2020-08-04 LAB — HEMATOPATHOLOGY REPORT: SIGNIFICANT CHANGE UP

## 2020-08-07 ENCOUNTER — APPOINTMENT (OUTPATIENT)
Dept: HEMATOLOGY ONCOLOGY | Facility: CLINIC | Age: 50
End: 2020-08-07

## 2020-09-30 NOTE — REVIEW OF SYSTEMS
[Shortness Of Breath] : shortness of breath [Joint Pain] : joint pain [Joint Stiffness] : joint stiffness [Negative] : Endocrine [FreeTextEntry2] : Fatigue, fever, night sweats [FreeTextEntry9] : Wheel chair  [de-identified] : No peripheral LAD

## 2020-09-30 NOTE — ADDENDUM
[FreeTextEntry1] : Patient's labs reviewed. She has persistent leukocytosis, normal range H/H and her platelet was noted to have dropped significantly from ~600K to ~100K. A smear was reviewed which did not show any platelet clump. (Note: Recent imaging reviewed, there was no splenomegaly.) WBC were high with some left shift. No blast was seen. Later resulted chemistry was reviewed yesterday. Her LDH and uric acid was high, mild hyperkalemia and deranged LFTs. Patient was planned to be called today with the results. But her mother called and reported that the patient's condition has deteriorated. They are advised to come to the [facility] ED. \par  \par

## 2020-09-30 NOTE — ASSESSMENT
[FreeTextEntry1] : Ms. Allison, a 50-year-old woman is evaluated for mediastinal and upper abdominal LAD with constitutional symptoms. She has long history of immunosuppressive medication use for her RA. The possibility of lymphoma is high. Will urgently arrange for biopsy followed by PET and bone marrow. Labs ordered. Will follow up in 2 weeks as soon as the biopsy results are available. \par \par Also noted her leuko- and thrombocytosis which are very likely related to her steroid use and RA, respectively. Will review a PBS. \par \par The patient has h/o melanoma. The possibility of metastatic recurrence can not be ruled out.

## 2020-09-30 NOTE — REASON FOR VISIT
[Initial Consultation] : an initial consultation for [Parent] : parent [FreeTextEntry2] : Lymphadenopathy in CT

## 2020-09-30 NOTE — HISTORY OF PRESENT ILLNESS
[5 - Distress Level] : Distress Level: 5 [de-identified] : 50-years-old Ms. Allison is evaluated for Mediastinal, hilar and upper abdominal Lymphadenopathy that was seen in a recent CT scan. She complains of fatigue, fever, sweats and SOB for the past week. She has lost about 5 lbs during this period. She also has long standing leukocytosis (neutrophilia) and thrombocytosis which are likely related to steroid use and RA, respectively. \par  \par Of note, the patient has a long history of RA and has taken Embrel, Xeljanz for several years. Lastly she was on Rinvoc for the past few weeks.  [de-identified] : 50 year-old Ms. FRAIRE is evaluated for

## 2020-09-30 NOTE — PHYSICAL EXAM
[Restricted in physically strenuous activity but ambulatory and able to carry out work of a light or sedentary nature] : Status 1- Restricted in physically strenuous activity but ambulatory and able to carry out work of a light or sedentary nature, e.g., light house work, office work [Normal] : normal appearance, no rash, nodules, vesicles, ulcers, erythema [de-identified] : Wheel chair ambulation [de-identified] : Thyromegaly [FreeTextEntry1] : NA [de-identified] : mild tenderness [de-identified] : NA [de-identified] : Joint stiffness

## 2020-09-30 NOTE — CONSULT LETTER
[Dear  ___] : Dear  [unfilled], [Consult Letter:] : I had the pleasure of evaluating your patient, [unfilled]. [Please see my note below.] : Please see my note below. [Consult Closing:] : Thank you very much for allowing me to participate in the care of this patient.  If you have any questions, please do not hesitate to contact me. [FreeTextEntry3] : \par

## 2021-05-14 NOTE — ED PROVIDER NOTE - EKG ADDITIONAL QUESTION - PERFORMED INDEPENDENT VISUALIZATION
NO ACTIVE VASCULITIS SEEN - NO UVEITIS - NO CELLS  - CONT METHOTREXATE AND PREDNISONE - IN PAST HAS RECURRED WHEN MEDS WERE REDUCED - DISCUSSED F/U WITH DR GILLIS AND PT TO CONSIDER. Yes

## 2021-10-19 NOTE — PROCEDURE NOTE - NSPPROCSEDTRACH_RESP_ALL_CORE
no palpitations/no dyspnea on exertion/no orthopnea/no paroxysmal nocturnal dyspnea/no peripheral edema Yes

## 2021-10-26 NOTE — ED ADULT TRIAGE NOTE - BP NONINVASIVE SYSTOLIC (MM HG)
Include Z78.9 (Other Specified Conditions Influencing Health Status) As An Associated Diagnosis?: No Medical Necessity Clause: This procedure was medically necessary because the lesions that were treated were: Post-Care Instructions: I reviewed with the patient in detail post-care instructions. Patient is to wear sunprotection, and avoid picking at any of the treated lesions. Pt may apply Vaseline to crusted or scabbing areas. Detail Level: Zone Render Post Care In The Note?: yes 101 Total Number Of Aks Treated: 7 Duration Of Freeze Thaw-Cycle (Seconds): 0 Medical Necessity Information: It is in your best interest to select a reason for this procedure from the list below. All of these items fulfill various CMS LCD requirements except the new and changing color options. Consent: The patient's consent was obtained including but not limited to risks of crusting, scabbing, blistering, scarring, darker or lighter pigmentary change, recurrence, incomplete removal and infection. Total Number Of Lesions Treated: 10 Number Of Freeze-Thaw Cycles: 1 freeze-thaw cycle